# Patient Record
Sex: MALE | Race: WHITE | NOT HISPANIC OR LATINO | URBAN - METROPOLITAN AREA
[De-identification: names, ages, dates, MRNs, and addresses within clinical notes are randomized per-mention and may not be internally consistent; named-entity substitution may affect disease eponyms.]

---

## 2017-02-15 ENCOUNTER — OFFICE VISIT - HEALTHEAST (OUTPATIENT)
Dept: FAMILY MEDICINE | Facility: CLINIC | Age: 57
End: 2017-02-15

## 2017-02-15 ENCOUNTER — RECORDS - HEALTHEAST (OUTPATIENT)
Dept: GENERAL RADIOLOGY | Facility: CLINIC | Age: 57
End: 2017-02-15

## 2017-02-15 DIAGNOSIS — J45.901 ASTHMA EXACERBATION: ICD-10-CM

## 2017-02-15 DIAGNOSIS — J10.1 INFLUENZA A: ICD-10-CM

## 2017-02-15 DIAGNOSIS — J02.9 SORE THROAT: ICD-10-CM

## 2017-02-15 DIAGNOSIS — R05.9 COUGH: ICD-10-CM

## 2017-02-15 ASSESSMENT — MIFFLIN-ST. JEOR: SCORE: 2016.67

## 2017-04-14 ENCOUNTER — OFFICE VISIT - HEALTHEAST (OUTPATIENT)
Dept: FAMILY MEDICINE | Facility: CLINIC | Age: 57
End: 2017-04-14

## 2017-04-14 DIAGNOSIS — M76.60 ACHILLES TENDINITIS: ICD-10-CM

## 2017-04-14 ASSESSMENT — MIFFLIN-ST. JEOR: SCORE: 2024.83

## 2017-06-19 ENCOUNTER — COMMUNICATION - HEALTHEAST (OUTPATIENT)
Dept: FAMILY MEDICINE | Facility: CLINIC | Age: 57
End: 2017-06-19

## 2017-06-19 DIAGNOSIS — J45.901 ASTHMA ATTACK: ICD-10-CM

## 2017-06-20 ENCOUNTER — COMMUNICATION - HEALTHEAST (OUTPATIENT)
Dept: FAMILY MEDICINE | Facility: CLINIC | Age: 57
End: 2017-06-20

## 2017-06-20 DIAGNOSIS — J45.901 ASTHMA ATTACK: ICD-10-CM

## 2017-06-21 ENCOUNTER — COMMUNICATION - HEALTHEAST (OUTPATIENT)
Dept: FAMILY MEDICINE | Facility: CLINIC | Age: 57
End: 2017-06-21

## 2017-06-21 DIAGNOSIS — J45.901 ASTHMA ATTACK: ICD-10-CM

## 2017-12-08 ENCOUNTER — AMBULATORY - HEALTHEAST (OUTPATIENT)
Dept: FAMILY MEDICINE | Facility: CLINIC | Age: 57
End: 2017-12-08

## 2017-12-08 ENCOUNTER — OFFICE VISIT - HEALTHEAST (OUTPATIENT)
Dept: FAMILY MEDICINE | Facility: CLINIC | Age: 57
End: 2017-12-08

## 2017-12-08 DIAGNOSIS — Z00.00 ROUTINE GENERAL MEDICAL EXAMINATION AT A HEALTH CARE FACILITY: ICD-10-CM

## 2017-12-08 DIAGNOSIS — G47.33 OBSTRUCTIVE SLEEP APNEA: ICD-10-CM

## 2017-12-08 DIAGNOSIS — J45.20 MILD INTERMITTENT ASTHMA WITHOUT COMPLICATION: ICD-10-CM

## 2017-12-08 DIAGNOSIS — J30.9 ALLERGIC RHINITIS: ICD-10-CM

## 2017-12-08 LAB
CHOLEST SERPL-MCNC: 209 MG/DL
FASTING STATUS PATIENT QL REPORTED: NO
HDLC SERPL-MCNC: 51 MG/DL
LDLC SERPL CALC-MCNC: 134 MG/DL
PSA SERPL-MCNC: 1.4 NG/ML (ref 0–3.5)
TRIGL SERPL-MCNC: 121 MG/DL

## 2017-12-08 ASSESSMENT — MIFFLIN-ST. JEOR: SCORE: 1917.1

## 2017-12-10 ENCOUNTER — COMMUNICATION - HEALTHEAST (OUTPATIENT)
Dept: FAMILY MEDICINE | Facility: CLINIC | Age: 57
End: 2017-12-10

## 2018-01-26 ENCOUNTER — OFFICE VISIT - HEALTHEAST (OUTPATIENT)
Dept: SLEEP MEDICINE | Facility: CLINIC | Age: 58
End: 2018-01-26

## 2018-01-26 DIAGNOSIS — G47.8 SLEEP DYSFUNCTION WITH SLEEP STAGE DISTURBANCE: ICD-10-CM

## 2018-01-26 DIAGNOSIS — G47.33 OSA (OBSTRUCTIVE SLEEP APNEA): ICD-10-CM

## 2018-01-26 ASSESSMENT — MIFFLIN-ST. JEOR: SCORE: 1917.1

## 2018-01-29 ENCOUNTER — AMBULATORY - HEALTHEAST (OUTPATIENT)
Dept: SLEEP MEDICINE | Facility: CLINIC | Age: 58
End: 2018-01-29

## 2018-01-31 ENCOUNTER — AMBULATORY - HEALTHEAST (OUTPATIENT)
Dept: SLEEP MEDICINE | Facility: CLINIC | Age: 58
End: 2018-01-31

## 2018-01-31 ENCOUNTER — COMMUNICATION - HEALTHEAST (OUTPATIENT)
Dept: SLEEP MEDICINE | Facility: CLINIC | Age: 58
End: 2018-01-31

## 2018-01-31 DIAGNOSIS — G47.10 HYPERSOMNIA: ICD-10-CM

## 2018-01-31 DIAGNOSIS — G47.33 OBSTRUCTIVE SLEEP APNEA: ICD-10-CM

## 2018-05-15 ENCOUNTER — COMMUNICATION - HEALTHEAST (OUTPATIENT)
Dept: FAMILY MEDICINE | Facility: CLINIC | Age: 58
End: 2018-05-15

## 2018-05-25 ENCOUNTER — AMBULATORY - HEALTHEAST (OUTPATIENT)
Dept: FAMILY MEDICINE | Facility: CLINIC | Age: 58
End: 2018-05-25

## 2018-05-25 ENCOUNTER — OFFICE VISIT - HEALTHEAST (OUTPATIENT)
Dept: FAMILY MEDICINE | Facility: CLINIC | Age: 58
End: 2018-05-25

## 2018-05-25 DIAGNOSIS — Z71.84 TRAVEL ADVICE ENCOUNTER: ICD-10-CM

## 2018-05-25 DIAGNOSIS — G47.33 OBSTRUCTIVE SLEEP APNEA: ICD-10-CM

## 2018-05-25 DIAGNOSIS — J45.20 MILD INTERMITTENT ASTHMA WITHOUT COMPLICATION: ICD-10-CM

## 2018-06-22 ENCOUNTER — COMMUNICATION - HEALTHEAST (OUTPATIENT)
Dept: SLEEP MEDICINE | Facility: CLINIC | Age: 58
End: 2018-06-22

## 2019-04-14 ENCOUNTER — COMMUNICATION - HEALTHEAST (OUTPATIENT)
Dept: FAMILY MEDICINE | Facility: CLINIC | Age: 59
End: 2019-04-14

## 2019-04-14 DIAGNOSIS — J45.20 MILD INTERMITTENT ASTHMA WITHOUT COMPLICATION: ICD-10-CM

## 2019-04-14 DIAGNOSIS — J45.901 ASTHMA ATTACK: ICD-10-CM

## 2019-06-14 ENCOUNTER — AMBULATORY - HEALTHEAST (OUTPATIENT)
Dept: FAMILY MEDICINE | Facility: CLINIC | Age: 59
End: 2019-06-14

## 2019-06-14 ENCOUNTER — COMMUNICATION - HEALTHEAST (OUTPATIENT)
Dept: TELEHEALTH | Facility: CLINIC | Age: 59
End: 2019-06-14

## 2019-06-14 ENCOUNTER — OFFICE VISIT - HEALTHEAST (OUTPATIENT)
Dept: FAMILY MEDICINE | Facility: CLINIC | Age: 59
End: 2019-06-14

## 2019-06-14 DIAGNOSIS — G47.33 OBSTRUCTIVE SLEEP APNEA: ICD-10-CM

## 2019-06-14 DIAGNOSIS — J45.20 MILD INTERMITTENT ASTHMA WITHOUT COMPLICATION: ICD-10-CM

## 2019-06-14 DIAGNOSIS — Z00.00 ROUTINE GENERAL MEDICAL EXAMINATION AT A HEALTH CARE FACILITY: ICD-10-CM

## 2019-06-14 LAB
CHOLEST SERPL-MCNC: 185 MG/DL
FASTING STATUS PATIENT QL REPORTED: YES
FASTING STATUS PATIENT QL REPORTED: YES
GLUCOSE BLD-MCNC: 76 MG/DL (ref 70–125)
HDLC SERPL-MCNC: 49 MG/DL
LDLC SERPL CALC-MCNC: 113 MG/DL
PSA SERPL-MCNC: 1.3 NG/ML (ref 0–4.5)
TRIGL SERPL-MCNC: 113 MG/DL

## 2019-06-14 RX ORDER — ALBUTEROL SULFATE 90 UG/1
AEROSOL, METERED RESPIRATORY (INHALATION)
Qty: 36 G | Refills: 5 | Status: SHIPPED | OUTPATIENT
Start: 2019-06-14 | End: 2022-04-06

## 2019-06-14 ASSESSMENT — MIFFLIN-ST. JEOR: SCORE: 1935.24

## 2019-06-16 ENCOUNTER — COMMUNICATION - HEALTHEAST (OUTPATIENT)
Dept: FAMILY MEDICINE | Facility: CLINIC | Age: 59
End: 2019-06-16

## 2019-11-18 ENCOUNTER — OFFICE VISIT - HEALTHEAST (OUTPATIENT)
Dept: FAMILY MEDICINE | Facility: CLINIC | Age: 59
End: 2019-11-18

## 2019-11-18 ENCOUNTER — COMMUNICATION - HEALTHEAST (OUTPATIENT)
Dept: TELEHEALTH | Facility: CLINIC | Age: 59
End: 2019-11-18

## 2019-11-18 ENCOUNTER — RECORDS - HEALTHEAST (OUTPATIENT)
Dept: GENERAL RADIOLOGY | Facility: CLINIC | Age: 59
End: 2019-11-18

## 2019-11-18 DIAGNOSIS — M79.642 PAIN IN BOTH HANDS: ICD-10-CM

## 2019-11-18 DIAGNOSIS — M79.641 PAIN IN RIGHT HAND: ICD-10-CM

## 2019-11-18 DIAGNOSIS — M79.641 PAIN IN BOTH HANDS: ICD-10-CM

## 2019-11-18 DIAGNOSIS — M79.642 PAIN IN LEFT HAND: ICD-10-CM

## 2019-11-20 ENCOUNTER — RECORDS - HEALTHEAST (OUTPATIENT)
Dept: ADMINISTRATIVE | Facility: OTHER | Age: 59
End: 2019-11-20

## 2020-07-28 ENCOUNTER — OFFICE VISIT - HEALTHEAST (OUTPATIENT)
Dept: FAMILY MEDICINE | Facility: CLINIC | Age: 60
End: 2020-07-28

## 2020-07-28 ENCOUNTER — RECORDS - HEALTHEAST (OUTPATIENT)
Dept: GENERAL RADIOLOGY | Facility: CLINIC | Age: 60
End: 2020-07-28

## 2020-07-28 DIAGNOSIS — M25.552 HIP PAIN, LEFT: ICD-10-CM

## 2020-07-28 DIAGNOSIS — M25.562 ACUTE PAIN OF LEFT KNEE: ICD-10-CM

## 2020-07-28 DIAGNOSIS — M25.50 POLYARTHRALGIA: ICD-10-CM

## 2020-07-28 DIAGNOSIS — M25.562 PAIN IN LEFT KNEE: ICD-10-CM

## 2020-07-29 ENCOUNTER — COMMUNICATION - HEALTHEAST (OUTPATIENT)
Dept: FAMILY MEDICINE | Facility: CLINIC | Age: 60
End: 2020-07-29

## 2020-07-29 LAB — B BURGDOR IGG+IGM SER QL: 0.02 INDEX VALUE

## 2020-07-30 ENCOUNTER — COMMUNICATION - HEALTHEAST (OUTPATIENT)
Dept: FAMILY MEDICINE | Facility: CLINIC | Age: 60
End: 2020-07-30

## 2020-08-17 ENCOUNTER — RECORDS - HEALTHEAST (OUTPATIENT)
Dept: ADMINISTRATIVE | Facility: OTHER | Age: 60
End: 2020-08-17

## 2020-09-01 ENCOUNTER — RECORDS - HEALTHEAST (OUTPATIENT)
Dept: ADMINISTRATIVE | Facility: OTHER | Age: 60
End: 2020-09-01

## 2020-09-21 ENCOUNTER — RECORDS - HEALTHEAST (OUTPATIENT)
Dept: ADMINISTRATIVE | Facility: OTHER | Age: 60
End: 2020-09-21

## 2021-01-21 ENCOUNTER — OFFICE VISIT (OUTPATIENT)
Dept: PRIMARY CARE CLINIC | Age: 61
End: 2021-01-21
Payer: COMMERCIAL

## 2021-01-21 VITALS
OXYGEN SATURATION: 96 % | DIASTOLIC BLOOD PRESSURE: 84 MMHG | BODY MASS INDEX: 37.29 KG/M2 | WEIGHT: 260.5 LBS | TEMPERATURE: 96.9 F | HEIGHT: 70 IN | SYSTOLIC BLOOD PRESSURE: 130 MMHG | HEART RATE: 115 BPM

## 2021-01-21 DIAGNOSIS — R19.7 DIARRHEA, UNSPECIFIED TYPE: Primary | ICD-10-CM

## 2021-01-21 DIAGNOSIS — Z20.822 ENCOUNTER FOR LABORATORY TESTING FOR COVID-19 VIRUS: ICD-10-CM

## 2021-01-21 DIAGNOSIS — R11.0 NAUSEA: ICD-10-CM

## 2021-01-21 DIAGNOSIS — R50.9 FEVER, UNSPECIFIED FEVER CAUSE: ICD-10-CM

## 2021-01-21 PROCEDURE — 99203 OFFICE O/P NEW LOW 30 MIN: CPT | Performed by: NURSE PRACTITIONER

## 2021-01-21 RX ORDER — ONDANSETRON 4 MG/1
4 TABLET, ORALLY DISINTEGRATING ORAL EVERY 8 HOURS PRN
Qty: 12 TABLET | Refills: 0 | Status: SHIPPED | OUTPATIENT
Start: 2021-01-21

## 2021-01-21 RX ORDER — FLUTICASONE PROPIONATE 220 UG/1
1 AEROSOL, METERED RESPIRATORY (INHALATION) 2 TIMES DAILY PRN
COMMUNITY
End: 2022-01-10 | Stop reason: SDUPTHER

## 2021-01-21 RX ORDER — FLUTICASONE PROPIONATE 50 MCG
1 SPRAY, SUSPENSION (ML) NASAL DAILY PRN
COMMUNITY

## 2021-01-21 ASSESSMENT — ENCOUNTER SYMPTOMS
CONSTIPATION: 0
ABDOMINAL PAIN: 0
TROUBLE SWALLOWING: 0
COUGH: 0
VOMITING: 1
SORE THROAT: 0
RHINORRHEA: 0
DIARRHEA: 1
NAUSEA: 1
SHORTNESS OF BREATH: 0

## 2021-01-21 NOTE — PROGRESS NOTES
Missael Armenta (:  1960) is a 56 y.o. male,New patient, here for evaluation of the following chief complaint(s):  Fever (sweated through two sets of sheets last night) and Diarrhea      ASSESSMENT/PLAN:  1. Diarrhea, unspecified type  -     ondansetron (ZOFRAN ODT) 4 MG disintegrating tablet; Take 1 tablet by mouth every 8 hours as needed for Nausea or Vomiting, Disp-12 tablet, R-0Normal  -     COVID-19  2. Fever, unspecified fever cause  -     ondansetron (ZOFRAN ODT) 4 MG disintegrating tablet; Take 1 tablet by mouth every 8 hours as needed for Nausea or Vomiting, Disp-12 tablet, R-0Normal  -     COVID-19  3. Nausea  -     ondansetron (ZOFRAN ODT) 4 MG disintegrating tablet; Take 1 tablet by mouth every 8 hours as needed for Nausea or Vomiting, Disp-12 tablet, R-0Normal  4. Encounter for laboratory testing for COVID-19 virus      No follow-ups on file. SUBJECTIVE/OBJECTIVE:  HPI  Here for fever and diarrhea  Onset Monday night. Thursday last [place] [facility] [place] [facility] Fever high 100.1  Took some nyquil. Last night sweat thru sheets into mattress. Review of Systems   Constitutional: Positive for chills, diaphoresis and fever. Negative for activity change, appetite change, fatigue and unexpected weight change. HENT: Negative for ear pain, rhinorrhea, sore throat and trouble swallowing. Eyes: Negative for visual disturbance. Respiratory: Negative for cough and shortness of breath. Cardiovascular: Negative for chest pain, palpitations and leg swelling. Gastrointestinal: Positive for diarrhea, nausea and vomiting. Negative for abdominal pain and constipation. Genitourinary: Negative for flank pain. Musculoskeletal: Negative for arthralgias, myalgias, neck pain and neck stiffness. Neurological: Negative for headaches. Psychiatric/Behavioral: Negative for decreased concentration and sleep disturbance. The patient is not nervous/anxious. Physical Exam  Vitals signs reviewed. Constitutional:       Appearance: Normal appearance. HENT:      Head: Normocephalic and atraumatic. Right Ear: Tympanic membrane, ear canal and external ear normal.      Left Ear: Tympanic membrane, ear canal and external ear normal.      Nose: Nose normal.      Mouth/Throat:      Mouth: Mucous membranes are moist.      Pharynx: Oropharynx is clear. Eyes:      Conjunctiva/sclera: Conjunctivae normal.   Neck:      Musculoskeletal: Normal range of motion and neck supple. Cardiovascular:      Rate and Rhythm: Normal rate and regular rhythm. Pulses: Normal pulses. Heart sounds: Normal heart sounds. Pulmonary:      Effort: Pulmonary effort is normal.      Breath sounds: Normal breath sounds. Abdominal:      General: Bowel sounds are normal. There is no distension. Palpations: Abdomen is soft. Tenderness: There is no abdominal tenderness. There is no guarding. Skin:     General: Skin is warm. Neurological:      Mental Status: He is alert and oriented to person, place, and time. An electronic signature was used to authenticate this note.     --LIAM Uriarte

## 2021-01-23 LAB — SARS-COV-2, NAA: NOT DETECTED

## 2021-02-22 ENCOUNTER — RECORDS - HEALTHEAST (OUTPATIENT)
Dept: ADMINISTRATIVE | Facility: OTHER | Age: 61
End: 2021-02-22

## 2021-05-04 ENCOUNTER — OFFICE VISIT - HEALTHEAST (OUTPATIENT)
Dept: FAMILY MEDICINE | Facility: CLINIC | Age: 61
End: 2021-05-04

## 2021-05-04 DIAGNOSIS — G47.33 OBSTRUCTIVE SLEEP APNEA: ICD-10-CM

## 2021-05-04 DIAGNOSIS — M17.12 PRIMARY OSTEOARTHRITIS OF LEFT KNEE: ICD-10-CM

## 2021-05-04 DIAGNOSIS — J45.20 MILD INTERMITTENT ASTHMA WITHOUT COMPLICATION: ICD-10-CM

## 2021-05-04 DIAGNOSIS — Z01.818 PREOP GENERAL PHYSICAL EXAM: ICD-10-CM

## 2021-05-04 LAB
ANION GAP SERPL CALCULATED.3IONS-SCNC: 12 MMOL/L (ref 5–18)
BUN SERPL-MCNC: 16 MG/DL (ref 8–22)
CALCIUM SERPL-MCNC: 9.3 MG/DL (ref 8.5–10.5)
CHLORIDE BLD-SCNC: 105 MMOL/L (ref 98–107)
CO2 SERPL-SCNC: 23 MMOL/L (ref 22–31)
CREAT SERPL-MCNC: 0.97 MG/DL (ref 0.7–1.3)
ERYTHROCYTE [DISTWIDTH] IN BLOOD BY AUTOMATED COUNT: 12.4 % (ref 11–14.5)
GFR SERPL CREATININE-BSD FRML MDRD: >60 ML/MIN/1.73M2
GLUCOSE BLD-MCNC: 108 MG/DL (ref 70–125)
HCT VFR BLD AUTO: 41.4 % (ref 40–54)
HGB BLD-MCNC: 14.3 G/DL (ref 14–18)
MCH RBC QN AUTO: 28.7 PG (ref 27–34)
MCHC RBC AUTO-ENTMCNC: 34.5 G/DL (ref 32–36)
MCV RBC AUTO: 83 FL (ref 80–100)
PLATELET # BLD AUTO: 230 THOU/UL (ref 140–440)
PMV BLD AUTO: 9.2 FL (ref 7–10)
POTASSIUM BLD-SCNC: 4 MMOL/L (ref 3.5–5)
RBC # BLD AUTO: 4.99 MILL/UL (ref 4.4–6.2)
SODIUM SERPL-SCNC: 140 MMOL/L (ref 136–145)
WBC: 5.4 THOU/UL (ref 4–11)

## 2021-05-04 ASSESSMENT — MIFFLIN-ST. JEOR: SCORE: 1976.07

## 2021-05-05 LAB
SARS-COV-2 PCR COMMENT: NORMAL
SARS-COV-2 RNA SPEC QL NAA+PROBE: NEGATIVE
SARS-COV-2 VIRUS SPECIMEN SOURCE: NORMAL

## 2021-05-06 ENCOUNTER — COMMUNICATION - HEALTHEAST (OUTPATIENT)
Dept: SCHEDULING | Facility: CLINIC | Age: 61
End: 2021-05-06

## 2021-05-10 ENCOUNTER — RECORDS - HEALTHEAST (OUTPATIENT)
Dept: ADMINISTRATIVE | Facility: OTHER | Age: 61
End: 2021-05-10

## 2021-05-25 ENCOUNTER — RECORDS - HEALTHEAST (OUTPATIENT)
Dept: ADMINISTRATIVE | Facility: OTHER | Age: 61
End: 2021-05-25

## 2021-05-27 VITALS
TEMPERATURE: 98.6 F | DIASTOLIC BLOOD PRESSURE: 76 MMHG | OXYGEN SATURATION: 96 % | BODY MASS INDEX: 37.08 KG/M2 | SYSTOLIC BLOOD PRESSURE: 126 MMHG | WEIGHT: 259 LBS | HEART RATE: 77 BPM | RESPIRATION RATE: 18 BRPM | HEIGHT: 70 IN

## 2021-05-27 NOTE — TELEPHONE ENCOUNTER
Patient is out of medication.  Uses this primary in the spring due to allergies.          Refill Request  Did you contact pharmacy: Yes  Medication name:   Requested Prescriptions     Pending Prescriptions Disp Refills     fluticasone propionate (FLOVENT HFA) 220 mcg/actuation inhaler [Pharmacy Med Name: FLOVENT  MC[facility] ORAL INH 120INH]  0     Sig: INHALE 1 PUFF BY MOUTH TWICE DAILY. RINSE MOUTH AFTER USE.     Who prescribed the medication: Sean Dhaliwal MD   Pharmacy Name and Location: [place] [address]  Is patient out of medication: Yes  Patient notified refills processed in 72 hours:  yes  Okay to leave a detailed message: yes

## 2021-05-27 NOTE — TELEPHONE ENCOUNTER
RN cannot approve Refill Request    RN can NOT refill this medication med is not covered by policy/route to provider       . Last office visit: 5/25/2018 Sean Dhaliwal MD Last Physical: 12/8/2017 Last MTM visit: Visit date not found Last visit same specialty: 5/25/2018 Sean Dhaliwal MD.  Next visit within 3 mo: Visit date not found  Next physical within 3 mo: Visit date not found      Beth Iqbal, [facility] Triage/Med Refill 4/15/2019    Requested Prescriptions   Pending Prescriptions Disp Refills     fluticasone propionate (FLOVENT HFA) 220 mcg/actuation inhaler [Pharmacy Med Name: FLOVENT  [facility] ORAL INH 120INH]  0     Sig: INHALE 1 PUFF BY MOUTH TWICE DAILY. RINSE MOUTH AFTER USE.       There is no refill protocol information for this order

## 2021-05-28 ASSESSMENT — ASTHMA QUESTIONNAIRES: ACT_TOTALSCORE: 20

## 2021-05-29 NOTE — PROGRESS NOTES
" Patient ID: Jose Harvey is a 59 y.o. male.  /76   Pulse (!) 59   Ht 5' 10\" (1.778 m)   Wt (!) 250 lb (113.4 kg)   SpO2 96%   BMI 35.87 kg/m      Assessment/Plan:                   Diagnoses and all orders for this visit:    Routine general medical examination at a health care facility  -     Lipid Cascade  -     Glucose  -     PSA, Annual Screen (Prostatic-Specific Antigen)    Mild intermittent asthma without complication  -     fluticasone propionate (FLOVENT HFA) 220 mcg/actuation inhaler; INHALE 1 PUFF BY MOUTH TWICE DAILY. RINSE MOUTH AFTER USE.  Dispense: 1 Inhaler; Refill: 6  -     albuterol (VENTOLIN HFA) 90 mcg/actuation inhaler; INHALE TWO PUFFS BY MOUTH EVERY 4 HOURS AS NEEDED  Dispense: 36 g; Refill: 5    Obstructive sleep apnea      DISCUSSION  Obtain labs as above.  Continue current management for asthma.  Subjective:     HPI    Jose Harvey is a 59-year-old man medical history includes mild persistent asthma under good control with typical triggers allergens.  He also has obstructive sleep apnea uses CPAP.  Reports no concerns regarding sleep apnea.  Takes no other prescription medications other than those to control asthma.  Is immunized appropriately.  Discussed routine laboratory work.  Reviewed routine health considerations.  Discussed briefly some joint pains.  Otherwise overall doing well.      Review of Systems  Complete review of systems is obtained.  Other than the specific considerations noted above complete review of systems is negative.          Objective:   Medications:  Current Outpatient Medications   Medication Sig     albuterol (VENTOLIN HFA) 90 mcg/actuation inhaler INHALE TWO PUFFS BY MOUTH EVERY 4 HOURS AS NEEDED     fluticasone (FLONASE) 50 mcg/actuation nasal spray 1 spray into each nostril daily.     fluticasone propionate (FLOVENT HFA) 220 mcg/actuation inhaler INHALE 1 PUFF BY MOUTH TWICE DAILY. RINSE MOUTH AFTER USE.     sildenafil, antihypertensive, " (REVATIO) 20 mg tablet Take 1-3 tablets (20-60 mg total) by mouth daily as needed.       Allergies:  Allergies   Allergen Reactions     Metronidazole        Tobacco:   reports that he has never smoked. He has never used smokeless tobacco.    HEALTH PREVENTION    General  Dental care: Discussed the importance of regular dental care.  Eye care: Discussed importance of routine eye exams for glaucoma screening  Exercise: Discussed increasing frequency and duration of exercise  Diet: Ellen ways to improve diet.    Wt Readings from Last 3 Encounters:   06/14/19 (!) 250 lb (113.4 kg)   05/25/18 (!) 248 lb (112.5 kg)   01/26/18 (!) 246 lb (111.6 kg)     Body mass index is 35.87 kg/m .    The following high BMI interventions were performed this visit: encouragement to exercise    Cancer screening  Testicular cancer:is discussed and exam performed today  Skin cancer: Discussed sun burn prevention and self monitoring.  Colon cancer: Colon cancer screening is discussed.  Due for colonoscopy based on 3-year follow-up in 2016.  We need to obtain updated colonoscopy records.  Prostate cancer: Discussed continued utilization of PSA and digital rectal exam for screening    Cholesterol:   LDL Calculated (mg/dL)   Date Value   12/08/2017 134 (H)   06/27/2016 123   10/22/2013 131 (H)      Blood Pressure:   BP Readings from Last 3 Encounters:   06/14/19 126/76   05/25/18 126/76   01/26/18 138/80     Immunization History   Administered Date(s) Administered     DT (pediatric) 03/01/2004     Hep A, Adult IM (19yr & older) 02/07/2008     Hep A, historic 02/07/2008     Influenza, Seasonal, Inj PF IIV3 10/08/2015     Influenza, inj, historic,unspecified 10/01/2015     Influenza, seasonal,quad inj 36+ mos 12/08/2017     Influenza, seasonal,quad inj 6-35 mos 10/22/2013     Influenza,seasonal, Inj IIV3 10/22/2013     Pneumo Conj 13-V (2010&after) 12/08/2017     Pneumo Polysac 23-V 10/22/2013     Td, Adult, Absorbed 03/01/2004      "Td,adult,historic,unspecified 03/01/2004     Tdap 10/22/2013     There are no preventive care reminders to display for this patient.     Physical Exam      /76   Pulse (!) 59   Ht 5' 10\" (1.778 m)   Wt (!) 250 lb (113.4 kg)   SpO2 96%   BMI 35.87 kg/m          General Appearance:    Alert, cooperative, no distress   Eyes:   No scleral icterus or conjunctival irritation       Ears:    Normal TM's and external ear canals, both ears   Throat:   Lips, mucosa, and tongue normal; teeth and gums normal   Neck:   Supple, symmetrical, trachea midline, no adenopathy;        thyroid:  No enlargement/tenderness/nodules   Lungs:     Clear to auscultation bilaterally, respirations unlabored, no wheezes or crackles   Heart:    Regular rate and rhythm,  No murmur   Abdomen:    Soft, no distention, no tenderness on palpation, no masses, no organomegaly     Extremities:  No edema, no joint swelling or redness, no evidence of any injuries   Skin:  No concerning skin findings, no suspicious moles, no rashes   Neurologic:  On gross examination there is no motor or sensory deficit.  Patient walks with a normal gait     Genitalia:   Normal testicular anatomy, no inguinal hernias, no skin findings in the genital region   Rectal:    Normal tone, no hemorrhoids masses or other anal rectal findings, prostate has a smooth uniform consistency without nodules                                 "

## 2021-05-30 VITALS — BODY MASS INDEX: 37.52 KG/M2 | HEIGHT: 71 IN | WEIGHT: 268 LBS

## 2021-05-30 VITALS — WEIGHT: 266.2 LBS | BODY MASS INDEX: 37.27 KG/M2 | HEIGHT: 71 IN

## 2021-05-31 VITALS — HEIGHT: 70 IN | BODY MASS INDEX: 35.22 KG/M2 | WEIGHT: 246 LBS

## 2021-06-01 VITALS — HEIGHT: 70 IN | WEIGHT: 246 LBS | BODY MASS INDEX: 35.22 KG/M2

## 2021-06-01 VITALS — BODY MASS INDEX: 35.58 KG/M2 | WEIGHT: 248 LBS

## 2021-06-02 ENCOUNTER — RECORDS - HEALTHEAST (OUTPATIENT)
Dept: ADMINISTRATIVE | Facility: CLINIC | Age: 61
End: 2021-06-02

## 2021-06-03 VITALS — HEIGHT: 70 IN | WEIGHT: 250 LBS | BODY MASS INDEX: 35.79 KG/M2

## 2021-06-03 NOTE — PROGRESS NOTES
Patient ID: Jose Harvey is a 59 y.o. male.  /76   Wt (!) 235 lb (106.6 kg)   BMI 33.72 kg/m      Assessment/Plan:                   Diagnoses and all orders for this visit:    Pain in both hands  -     XR Hands Bilateral 2 VWS; Future; Expected date: 11/18/2019    Other orders  -     Cancel: Influenza, Recombinant, Inj, Quadrivalent, PF, 18+YRS           DISCUSSION  For now continue conservative means for treatment, will refer to orthopedic hand specialist for more definitive evaluation and treatment.  Subjective:     HPI    Jose Harvey is a 59 y.o. male with a history of asthma and obstructive sleep apnea here today concerned regarding bilateral wrist hand and thumb pain.    Patient reports that he has pain primarily in the left hand but present in both.  It is present near the base of the thumb at the wrist.  It is quite tender and bothersome.  He has tried conservative means for treatment including relative rest, icing and over-the-counter medications none of which have been helpful in alleviating the symptoms.  It seems to be more progressive.  He does not note any swelling or redness.  He has no history of specific trauma.  He does report he was doing some boxing for fitness within the past year but has since stopped.  He denies any numbness or tingling.      Review of Systems  Complete review of systems is obtained.  Other than the specific considerations noted above complete review of systems is negative.          Objective:   Medications:  Current Outpatient Medications   Medication Sig     albuterol (VENTOLIN HFA) 90 mcg/actuation inhaler INHALE TWO PUFFS BY MOUTH EVERY 4 HOURS AS NEEDED     fluticasone (FLONASE) 50 mcg/actuation nasal spray 1 spray into each nostril daily.     fluticasone propionate (FLOVENT HFA) 220 mcg/actuation inhaler INHALE 1 PUFF BY MOUTH TWICE DAILY. RINSE MOUTH AFTER USE.     sildenafil, antihypertensive, (REVATIO) 20 mg tablet Take 1-3 tablets (20-60 mg total)  by mouth daily as needed.       Allergies:  Allergies   Allergen Reactions     Metronidazole        Tobacco:   reports that he has never smoked. He has never used smokeless tobacco.     Physical Exam          /76   Wt (!) 235 lb (106.6 kg)   BMI 33.72 kg/m        General: Patient alert no signs of distress    Examination of his hands reveals that there is no obvious joint deformities or bony deformities.  There is no redness bruising or swelling.  He has good range of motion.  Reports pain at the base of the thumb near the metacarpal carpal joint region.  There is no distinct anatomical snuffbox tenderness.  He does report some wrist and redness and stiffness but does not seem to have any limitation of range of motion on either side.  There is no evidence of muscle atrophy.  Sensation is intact distally.    Plain film shows degenerative changes, there is significant changes noted in the left metacarpal carpal joint region.

## 2021-06-04 VITALS
SYSTOLIC BLOOD PRESSURE: 140 MMHG | DIASTOLIC BLOOD PRESSURE: 90 MMHG | WEIGHT: 258.7 LBS | HEART RATE: 70 BPM | BODY MASS INDEX: 37.12 KG/M2 | OXYGEN SATURATION: 96 %

## 2021-06-04 VITALS — SYSTOLIC BLOOD PRESSURE: 128 MMHG | DIASTOLIC BLOOD PRESSURE: 76 MMHG | WEIGHT: 235 LBS | BODY MASS INDEX: 33.72 KG/M2

## 2021-06-08 NOTE — PROGRESS NOTES
"Jose is a 56 y.o. male presenting to the clinic for concerns for cold symptoms for 7 days.  Patient complains of sinus congestion, postnasal drainage, sore throat, productive cough, fatigue, body aches, lack of appetite, shortness of breath, chest tightness, and wheezing.  He denies fever, stomachache, nausea, vomiting.  He has been taking over-the-counter NyQuil. No one else around his is ill.   He did not receive an influenza vaccine.  He does have asthma that has not needed his Ventolin inhaler.  He typically does not use Flovent until spring.    Review of Systems: A complete 14 point review of systems was obtained and is negative or as stated in the history of present illness.    Social History     Social History     Marital status:      Spouse name: N/A     Number of children: N/A     Years of education: N/A     Occupational History     Not on file.     Social History Main Topics     Smoking status: Never Smoker     Smokeless tobacco: Not on file     Alcohol use Not on file     Drug use: Not on file     Sexual activity: Not on file     Other Topics Concern     Not on file     Social History Narrative       Active Ambulatory Problems     Diagnosis Date Noted     Chronic Allergic Conjunctivitis      Allergic Rhinitis      Mild Persistent Asthma      Male Erectile Disorder      Esophageal Reflux      Skin Neoplasm Of Uncertain Behavior      Obstructive Sleep Apnea      Fatigue      Benign Adenomatous Polyp Of The Large Intestine      Fever 02/05/2015     Nasal congestion 02/05/2015     Cough 02/05/2015     Resolved Ambulatory Problems     Diagnosis Date Noted     No Resolved Ambulatory Problems     No Additional Past Medical History       No family history on file.    OBJECTIVE:     Visit Vitals     /70 (Patient Site: Left Arm, Patient Position: Sitting, Cuff Size: Adult Large)     Pulse 75     Temp 98  F (36.7  C)     Ht 5' 10.5\" (1.791 m)     Wt (!) 266 lb 3.2 oz (120.7 kg)     SpO2 97%     BMI " 37.66 kg/m2       Patient is alert, in no obvious distress.   Skin: Warm, dry.  No lesions or rashes.  Skin turgor rapid return.   HEENT:  Head normocephalic, atraumatic.  Eyes normal.  Ears normal.  Nose patent, mucosa red.  Oropharynx erythematous.  No lesions or tonsillar enlargement.   Neck: Supple, no lymphadenopathy.  Lungs:  Expiratory wheezing heard in bilateral lung bases.  Respirations even and unlabored.  No rhonchi or rales noted.   Heart:  Regular rate and rhythm.  No murmurs.     LABORATORY: Rapid strep, strep culture, influenza A and B ordered.  Rapid strep is negative.  Influenza A is positive.    I ordered and personally reviewed a chest x-ray showing no obvious infiltrate.  Will have radiology review.        ASSESSMENT AND PLAN:     1. Influenza A     2. Asthma exacerbation     3. Cough  Influenza A/B Rapid Test    XR Chest PA and Lateral   4. Sore throat  Rapid Strep A Screen-Throat    Group A Strep, RNA Direct Detection, Throat     Discussed viral nature of influenza.  I discussed my concern regarding his wheezing.  He refused an oral steroid stating that he plans on using his inhalers.  He was discouraged that he was not receiving an antibiotic today.  I told him that I would notify him of the radiology results.  Discussed concerning symptoms of fever and worsening cough.  If this occurs, I urged him to follow-up with Dr. Dhaliwal.

## 2021-06-10 NOTE — PROGRESS NOTES
" Patient ID: Jose Harvey is a 57 y.o. male.  /76  Pulse 66  Ht 5' 10.5\" (1.791 m)  Wt (!) 268 lb (121.6 kg)  SpO2 97%  BMI 37.91 kg/m2    Assessment/Plan:                   Diagnoses and all orders for this visit:    Achilles tendinitis      DISCUSSION  There is are consistent with Achilles tendinitis.  Discussed conservative care including good supportive footwear.  Discussed basic rehabilitation exercises as well as the principles of rest ice and use of NSAIDs.  Subjective:     HPI    Jose Harvey is a 57-year-old man who is here today concerned regarding pain in the Achilles tendon on the left.  Symptoms have been present for about 2 months.  No known inciting factor.  No history of injury.  Pain symptoms have waxed and waned.  Has tried new footwear along with inserts which have helped somewhat.  After returning from vacation reports that pain symptoms worsened considerably.  Doing better after exercising for a couple of days.  Denies numbness or tingling.  No swelling or redness.    Review of Systems  Complete review of systems is obtained.  Other than the specific considerations noted above complete review of systems is negative.          Objective:   Medications:  Current Outpatient Prescriptions   Medication Sig     FLOVENT  mcg/actuation inhaler INHALE ONE PUFF TWICE DAILY, RINSE MOUTH AFTER USE     fluticasone (FLONASE) 50 mcg/actuation nasal spray INSTILL 2 SPRAYS IN EACH NOSTRIL ONCE DAILY     sildenafil (REVATIO) 20 mg tablet Take 1-3 tablets (20-60 mg total) by mouth daily as needed.     VENTOLIN HFA 90 mcg/actuation inhaler INHALE TWO PUFFS BY MOUTH EVERY 4 HOURS AS NEEDED     polymyxin B sulf-trimethoprim (POLYTRIM) 10,000 unit- 1 mg/mL Drop ophthalmic drops Administer 2 drops to both eyes 4 (four) times a day.       Allergies:  Allergies   Allergen Reactions     Metronidazole        Tobacco:   reports that he has never smoked. He does not have any smokeless tobacco history " "on file.     Physical Exam          /76  Pulse 66  Ht 5' 10.5\" (1.791 m)  Wt (!) 268 lb (121.6 kg)  SpO2 97%  BMI 37.91 kg/m2      General: Patient is alert no signs of distress    Foot: Examination of the left foot does not reveal any bruising redness or swelling.  He is a relatively high arch in the foot.  No pain to palpation along the bottom surface of the foot.  In the more posterior heel there is tenderness along with tenderness in the distal aspect of the Achilles near its insertion site on the calcaneus.  There is no bruising redness or swelling.  Palpation reveals that the Achilles is obviously intact.  He has full dorsiflexion and plantarflexion although complains of some pain.            "

## 2021-06-10 NOTE — TELEPHONE ENCOUNTER
----- Message from Afua Araujo CMA sent at 7/29/2020  8:33 AM CDT -----    ----- Message -----  From: Clara Luther CNP  Sent: 7/28/2020   5:08 PM CDT  To: Clara Luther Care Team Pool    Please notify the patient that his x-ray showed degenerative changes (osteoarthritis) of the knee.  He also has calcification of the cartilage in his knee.  I recommend he see orthopedics as we discussed.  Thanks.

## 2021-06-10 NOTE — PROGRESS NOTES
Assessment and Plan:     1. Acute pain of left knee  XR Knee Left 1 or 2 VWS    Ambulatory referral to Orthopedics   2. Hip pain, left     3. Polyarthralgia  Lyme Antibody [place]     Differentials for left knee pain include osteoarthritis, bursitis, meniscus or ligament injury.  X-ray shows osteoarthritis.  He has a history of a right knee replacement.  Will refer to orthopedics for further evaluation and treatment.  Hip pain is likely due to compensation for the knee pain.  Patient requests Lyme's testing.  Will notify patient of results.  He is content with the plan.    Subjective:     Jose is a 60 y.o. male presenting to the clinic for concerns for left knee pain for 3 months.  Patient denies any known injury, but states that he he was bucked off a horse in the wintertime.  He landed on his right side instead of his left.  He has difficulty walking up and down stairs.  He states the knee is unstable and is weak.  He is now experiencing pain within his left hip and lower lumbar region.  He admits he has gained 30 pounds since the onset of the COVID-19 pandemic.  He has not noticed any redness, swelling, bruising of the knee.  He does have some pain behind the knee.  He has been taking Tylenol arthritis.  He has a history of a right knee replacement in 2011.  Patient also requests Lyme's testing.  [place].  He has been told by numerous friends that they have tested positive for Lyme's.  He has been experiencing some mild fatigue.  He denies any known tick bites.    Review of Systems: A complete 14 point review of systems was obtained and is negative or as stated in the history of present illness.    Social History     Socioeconomic History     Marital status:      Spouse name: Not on file     Number of children: Not on file     Years of education: Not on file     Highest education level: Not on file   Occupational History     Not on file   Social Needs      Financial resource strain: Not on file     Food insecurity     Worry: Not on file     Inability: Not on file     Transportation needs     Medical: Not on file     Non-medical: Not on file   Tobacco Use     Smoking status: Never Smoker     Smokeless tobacco: Never Used   Substance and Sexual Activity     Alcohol use: Not on file     Drug use: Not on file     Sexual activity: Not on file   Lifestyle     Physical activity     Days per week: Not on file     Minutes per session: Not on file     Stress: Not on file   Relationships     Social connections     Talks on phone: Not on file     Gets together: Not on file     Attends Restoration service: Not on file     Active member of club or organization: Not on file     Attends meetings of clubs or organizations: Not on file     Relationship status: Not on file     Intimate partner violence     Fear of current or ex partner: Not on file     Emotionally abused: Not on file     Physically abused: Not on file     Forced sexual activity: Not on file   Other Topics Concern     Not on file   Social History Narrative     Not on file       Active Ambulatory Problems     Diagnosis Date Noted     Chronic Allergic Conjunctivitis      Allergic Rhinitis      Asthma      Male Erectile Disorder      Esophageal Reflux      Skin Neoplasm Of Uncertain Behavior      Obstructive sleep apnea      Fatigue      Benign Adenomatous Polyp Of The Large Intestine      Fever 02/05/2015     Nasal congestion 02/05/2015     Cough 02/05/2015     Resolved Ambulatory Problems     Diagnosis Date Noted     No Resolved Ambulatory Problems     No Additional Past Medical History       No family history on file.    Objective:     /90   Pulse 70   Wt (!) 258 lb 11.2 oz (117.3 kg)   SpO2 96%   BMI 37.12 kg/m      Patient is alert, in no obvious distress.   Skin: Warm, dry.    Lungs:  Clear to auscultation. Respirations even and unlabored.  No wheezing or rales noted.   Heart:  Regular rate and rhythm.  No  murmurs, S3, S4, gallops, or rubs.    Musculoskeletal: He has full range of motion of his left knee and hip.  There are no areas of erythema, ecchymosis, signs of trauma.  Valgus, varus, Lachman's, [place]'s are negative.    LABORATORY: I ordered and personally reviewed left knee x-rays osteoarthritis.  Will have radiology review.

## 2021-06-14 NOTE — PROGRESS NOTES
" Patient ID: Jose Harvey is a 57 y.o. male.  /80  Pulse 95  Ht 5' 10\" (1.778 m)  Wt (!) 246 lb (111.6 kg)  SpO2 97%  BMI 35.3 kg/m2    Assessment/Plan:                   Diagnoses and all orders for this visit:    Routine general medical examination at a health care facility  -     Influenza, Seasonal,Quad Inj, 36+ MOS  -     Lipid [place] FASTING  -     PSA, Annual Screen (Prostatic-Specific Antigen)  -     Glucose; Future  -     Glucose    Mild intermittent asthma without complication  -     fluticasone (FLOVENT HFA) 220 mcg/actuation inhaler; INHALE 1 PUFF BY MOUTH TWICE DAILY. RINSE MOUTH AFTER USE  Dispense: 1 Inhaler; Refill: 5  -     albuterol (VENTOLIN HFA) 90 mcg/actuation inhaler; INHALE TWO PUFFS BY MOUTH EVERY 4 HOURS AS NEEDED  Dispense: 36 g; Refill: 5  -     Pneumococcal conjugate vaccine 13-valent 6wks-17yrs; >50yrs    Allergic rhinitis    Obstructive sleep apnea  -     Ambulatory referral to Sleep Medicine    Other orders  -     Discontinue: sildenafil, antihypertensive, (REVATIO) 20 mg tablet; Take 1-3 tablets (20-60 mg total) by mouth daily as needed.  Dispense: 20 tablet; Refill: 6  -     sildenafil, antihypertensive, (REVATIO) 20 mg tablet; Take 1-3 tablets (20-60 mg total) by mouth daily as needed.  Dispense: 20 tablet; Refill: 6      DISCUSSION  Obtain labs as above medications refilled.  Referral to sleep specialist for reevaluation of his sleep apnea given the difficulties he has had tolerating the CPAP recently.  Update immunizations as noted.  Subjective:     HPI    Jose Harvey is a 57-year-old man who is here today for a physical.  His medical history includes intermittent asthma.  Asthma is triggered by allergens.  Utilizes controller medication along with as needed rescue inhaler in the fall and spring of the year.  Not using medication right now and reports no symptoms whatsoever recently.  He has sleep apnea has used CPAP.  Reports that he feels the CPAP is making " him get sinus congestion issues on a regular basis.  Discussed referral to sleep specialist.  His history of erectile dysfunction has used sildenafil with success at 60 mg as needed.  Discussed other routine health preventive measures.      Review of Systems  Complete review of systems is obtained.  Other than the specific considerations noted above complete review of systems is negative.          Objective:   Medications:  Current Outpatient Prescriptions   Medication Sig     albuterol (VENTOLIN HFA) 90 mcg/actuation inhaler INHALE TWO PUFFS BY MOUTH EVERY 4 HOURS AS NEEDED     fluticasone (FLONASE) 50 mcg/actuation nasal spray 1 spray into each nostril daily.     fluticasone (FLOVENT HFA) 220 mcg/actuation inhaler INHALE ONE PUFF TWICE DAILY, RINSE MOUTH AFTER USE     fluticasone (FLOVENT HFA) 220 mcg/actuation inhaler INHALE 1 PUFF BY MOUTH TWICE DAILY. RINSE MOUTH AFTER USE     sildenafil, antihypertensive, (REVATIO) 20 mg tablet Take 1-3 tablets (20-60 mg total) by mouth daily as needed.       Allergies:  Allergies   Allergen Reactions     Metronidazole        Tobacco:   reports that he has never smoked. He does not have any smokeless tobacco history on file.    HEALTH PREVENTION    General  Dental care: Discussed the importance of regular dental care.  Eye care: Discussed importance of routine eye exams for glaucoma screening  Exercise: Reviewed his exercise regimen discussed the importance of continuing to maintain a regular exercise regimen.  Diet: Reviewed principles of a healthy balanced diet.      Wt Readings from Last 3 Encounters:   12/08/17 (!) 246 lb (111.6 kg)   04/14/17 (!) 268 lb (121.6 kg)   02/15/17 (!) 266 lb 3.2 oz (120.7 kg)     Body mass index is 35.3 kg/(m^2).    The following high BMI interventions were performed this visit: encouragement to exercise and weight monitoring    Cancer screening  Testicular cancer:is discussed and exam performed today  Skin cancer: Discussed sun burn prevention  "and self monitoring.  Colon cancer: Colon cancer screening is discussed.  Reviewed most recent colonoscopy report.  Prostate cancer: Escobar considerations related to prostate cancer screening utilizing PSA and digital rectal exam.    Cholesterol:   LDL Calculated (mg/dL)   Date Value   06/27/2016 123   10/22/2013 131 (H)      Blood Pressure:   BP Readings from Last 3 Encounters:   12/08/17 138/80   04/14/17 126/76   02/15/17 120/70     Immunization History   Administered Date(s) Administered     DT (pediatric) 03/01/2004     Hep A, Adult IM (19yr & older) 02/07/2008     Hep A, historic 02/07/2008     Influenza, Seasonal, Inj PF IIV3 10/08/2015     Influenza, inj, historic,unspecified 10/01/2015     Influenza, seasonal,quad inj 36+ mos 12/08/2017     Influenza, seasonal,quad inj 6-35 mos 10/22/2013     Influenza,seasonal, Inj IIV3 10/22/2013     Pneumo Conj 13-V (2010&after) 12/08/2017     Pneumo Polysac 23-V 10/22/2013     Td, Adult, Absorbed 03/01/2004     Td,adult,historic,unspecified 03/01/2004     Tdap 10/22/2013     There are no preventive care reminders to display for this patient.     Physical Exam          /80  Pulse 95  Ht 5' 10\" (1.778 m)  Wt (!) 246 lb (111.6 kg)  SpO2 97%  BMI 35.3 kg/m2    General Appearance:    Alert, cooperative, no distress, appears stated age   Head:    Normocephalic, without obvious abnormality, atraumatic   Eyes:    PERRL, conjunctiva/corneas clear, EOM's intact       Ears:    Normal TM's and external ear canals, both ears   Nose:   Nares normal, septum midline, mucosa normal, no drainage    or sinus tenderness   Throat:   Lips, mucosa, and tongue normal; teeth and gums normal   Neck:   Supple, symmetrical, trachea midline, no adenopathy;        thyroid:  No enlargement/tenderness/nodules   Lungs:     Clear to auscultation bilaterally, respirations unlabored   Chest wall/ Breast:    No tenderness or deformity   Heart:    Regular rate and rhythm, S1 and S2 normal, no " murmur, rub   or gallop   Abdomen:     Soft, non-tender, bowel sounds active all four quadrants,     no masses, no organomegaly   Genitalia:   Normal testicular anatomy no inguinal hernias   Rectal:   Uniform consistency of the prostate no nodules   Extremities:   Extremities normal, atraumatic, no cyanosis or edema   Pulses:   2+ and symmetric all extremities   Skin:   Skin color, texture, turgor normal, no rashes or lesions   Neurologic:   CNII-XII intact. Normal strength, sensation and reflexes       throughout

## 2021-06-15 NOTE — PROGRESS NOTES
Dear Dr. Sean Dhaliwal Md  [address]  [address]  [place] [address]    Thank you for the opportunity to participate in the care of Mr. Jose Harvey.    He is a 57 y.o. male who comes to the clinic for the transfer of care of his obstructive sleep apnea.  The patient was actually diagnosed here in this clinic in 2014 with severe obstructive sleep apnea.  His apnea hypopnea index was grossly elevated at 51.6 events per hour with the lowest O2 sat of 68%.  For further details concerning the patient's sleep study report please refer to the [facility].  The patient states that he had been using CPAP until 6 months ago when he was getting recurrent sinus infections.  His first 2-3 years of CPAP usage was good.  He would be interested in establishing care to get durable medical equipment company from us.  We discussed some strategies and how to mitigate the risk of sinus infections such as timely replacement of filters and scheduled cleaning of equipment.  The patient's review of system is otherwise unremarkable.     Past Medical History  No past medical history on file.     Past Surgical History  No past surgical history on file.     Meds  Current Outpatient Prescriptions   Medication Sig Dispense Refill     albuterol (VENTOLIN HFA) 90 mcg/actuation inhaler INHALE TWO PUFFS BY MOUTH EVERY 4 HOURS AS NEEDED 36 g 5     fluticasone (FLONASE) 50 mcg/actuation nasal spray 1 spray into each nostril daily.       fluticasone (FLOVENT HFA) 220 mcg/actuation inhaler INHALE ONE PUFF TWICE DAILY, RINSE MOUTH AFTER USE 1 Inhaler 5     fluticasone (FLOVENT HFA) 220 mcg/actuation inhaler INHALE 1 PUFF BY MOUTH TWICE DAILY. RINSE MOUTH AFTER USE 1 Inhaler 5     sildenafil, antihypertensive, (REVATIO) 20 mg tablet Take 1-3 tablets (20-60 mg total) by mouth daily as needed. 20 tablet 6     No current facility-administered medications for this visit.         Allergies  Metronidazole     Social History  Social History     Social  History     Marital status:      Spouse name: N/A     Number of children: N/A     Years of education: N/A     Occupational History     Not on file.     Social History Main Topics     Smoking status: Never Smoker     Smokeless tobacco: Never Used     Alcohol use Not on file     Drug use: Not on file     Sexual activity: Not on file     Other Topics Concern     Not on file     Social History Narrative        Family History  No family history on file.     Review of Systems:  Constitutional: Negative except as noted in HPI.   Eyes: Negative except as noted in HPI.   ENT: Negative except as noted in HPI.   Cardiovascular: Negative except as noted in HPI.   Respiratory: Negative except as noted in HPI.   Gastrointestinal: Negative except as noted in HPI.   Genitourinary: Negative except as noted in HPI.   Musculoskeletal: Negative except as noted in HPI.   Integumentary: Negative except as noted in HPI.   Neurological: Negative except as noted in HPI.   Psychiatric: Negative except as noted in HPI.   Endocrine: Negative except as noted in HPI.   Hematologic/Lymphatic: Negative except as noted in HPI.      STOP BANG 1/26/2018   Do you snore loudly (louder than talking or loud enough to be heard through closed doors)? 1   Do you often feel tired, fatigued, or sleepy during daytime? 1   Has anyone observed you stop breathing in your sleep? 1   Do you have or are you being treated for high blood pressure? 0   BMI more than 35 kg/m2 1   Age over 50 years old? 1   Gender male? 1   Epworths Sleepiness Scale 1/26/2018   Sitting and reading 3   Watching TV 3   Sitting, inactive in a public place (e.g. a theatre or a meeting) 3   As a passenger in a car for an hour without a break 1   Lying down to rest in the afternoon when circumstances permit 2   Sitting and talking to someone 1   Sitting quietly after a lunch without alcohol 2   In a car, while stopped for a few minutes in traffic 1   Total score 16   Rooming 1/26/2018  "  Usual bedtime 9pm   Sleep Latency few minutes   Awakenings 4-5 times   Wake Up Time 4:30am   Weekends same   Energy Drinks 0   Coffee yes   Cola 0   Difficulty falling asleep No   Difficulty staying asleep Yes   Excessive daytime tiredness Yes   Dozing off while driving Yes   Shift Worker No   Sleep Walking? No   Sleep Talking? No   Kicking or punching? No   Restless legs symptoms No       Physical Exam:  /80  Pulse 95  Ht 5' 10\" (1.778 m)  Wt (!) 246 lb (111.6 kg)  SpO2 97%  BMI 35.3 kg/m2  BMI:Body mass index is 35.3 kg/(m^2).   GEN: NAD, obese  Head: Normocephalic.  EYES: PERRLA, EOMI  ENT: Oropharynx is clear, mallampatti class 3+ airway. Uvula is intact  Nasal mucosa is moist without erythema  Neck : Thyroid is within normal limits.  CV: Regular rate and rhythm, S1 & S2 positive.  LUNGS: Bilateral breathsounds heard.   ABDOMEN: Positive bowel sounds in all quadrants, soft, no rebound or guarding  MUSCULOSKELETAL: No leg swelling  SKIN: warm, dry, no rashes  Neurological: Alert, oriented to time, place, and person.  Psych: normal mood, normal affect     Labs/Studies:     Lab Results   Component Value Date    WBC 6.6 10/22/2013    HGB 14.1 10/22/2013    HCT 43.0 10/22/2013    MCV 84 10/22/2013     10/22/2013         Chemistry        Component Value Date/Time     10/22/2013 1110    K 4.5 10/22/2013 1110     10/22/2013 1110    CO2 26 10/22/2013 1110    BUN 14 10/22/2013 1110    CREATININE 1.12 10/22/2013 1110    GLU 87 12/08/2017 1415        Component Value Date/Time    CALCIUM 9.7 10/22/2013 1110            Lab Results   Component Value Date    FERRITIN 23 (L) 09/14/2010     Lab Results   Component Value Date    TSH 0.82 06/27/2016         Assessment and Plan:  In summary Jose Harvey is a 57 y.o. year old male here for transfer of care.  1.  Obstructive sleep apnea  I had the patient fill out a transfer of the durable medical equipment form.  Once he is able to get appointment " with our specialist he will get another mask fitting and instructions how to properly clean and maintenance his equipment.  2.  Other sleep disturbance    Patient verbalized understanding of these issues, agrees with the plan and all questions were answered today. Patient was given an opportuntity to voice any other symptoms or concerns not listed above. Patient did not have any other symptoms or concerns.      Patient told to return in one week after the sleep study is interpreted.      Fermin Morin DO  Board Certified in Internal Medicine and Sleep Medicine  [facility].    (Note created with Dragon voice recognition and unintended spelling errors and word substitutions may occur)

## 2021-06-17 NOTE — PROGRESS NOTES
[facility]  [address]  [facility] [address]  Dept: [phone]  Dept Fax: [phone]  Primary Provider: Lavonne Irving MD  Pre-op Performing Provider: LAVONNE IRVING    PREOPERATIVE EVALUATION:  Today's date: 5/4/2021    Jose Harvey is a 61 y.o. male who presents for a preoperative evaluation.    Surgical Information:  Surgery/Procedure: left knee replacement  Surgery Location: [facility]  Surgeon: Dr. Vargas  Surgery Date: 5/10/21  Time of Surgery: TBD  Where patient plans to recover: At home with family  Fax number for surgical facility: [phone]    Type of Anesthesia Anticipated: to be determined    Assessment & Plan      The proposed surgical procedure is considered INTERMEDIATE risk.    Jose was seen today for pre-op exam, knee pain and sinusitis.    Diagnoses and all orders for this visit:    Preop general physical exam  -     HM2(CBC w/o Differential)  -     Cancel: Electrocardiogram Perform and Read  -     Basic Metabolic Panel  -     Asymptomatic COVID-19 Virus (CORONAVIRUS) PCR; Future    Primary osteoarthritis of left knee    Mild intermittent asthma without complication    Obstructive sleep apnea      Risks and Recommendations:  The patient has the following additional risks and recommendations for perioperative complications:   - No identified additional risk factors other than previously addressed    Medication Instructions:  Patient is to take all scheduled medications on the day of surgery    RECOMMENDATION:  APPROVAL GIVEN to proceed with proposed procedure, without further diagnostic evaluation.  [id number]}    Subjective     HPI related to upcoming procedure:     He has symptomatic left knee osteoarthritis and has exhausted conservative means for treatment and is now scheduled for knee arthroplasty.    His medical history includes mild persistent asthma under good control with typical triggers being allergens.  He has obstructive  sleep apnea and uses a CPAP.  He otherwise takes no prescription medications.  He has no history of heart disease or lung disease other than asthma.  Reports good activity tolerance without any acute illness.    He does note a mild degree of discomfort in the right side of his jaw.  We discussed this thoroughly and examined him today noting no sign of infection.    Preop Questions 5/4/2021   Have you ever had a heart attack or stroke? No   Have you ever had surgery on your heart or blood vessels, such as a stent placement, a coronary artery bypass, or surgery on an artery in your head, neck, heart, or legs? No   Do you have chest pain with activity? No   Do you have a history of  heart failure? No   Do you currently have a cold, bronchitis or symptoms of other infection? No   Do you have a cough, shortness of breath, or wheezing? No   Do you or anyone in your family have previous history of blood clots? No   Do you or does anyone in your family have a serious bleeding problem such as prolonged bleeding following surgeries or cuts? No   Have you ever had problems with anemia or been told to take iron pills? No   Have you had any abnormal blood loss such as black, tarry or bloody stools? No   Have you ever had a blood transfusion? No   Are you willing to have a blood transfusion if it is medically needed before, during, or after your surgery? Yes   Have you or any of your relatives ever had problems with anesthesia? YES -    Do you have sleep apnea, excessive snoring or daytime drowsiness? YES - does have one   Do you have a CPAP machine? Yes   Do you have any artifical heart valves or other implanted medical devices like a pacemaker, defibrillator, or continuous glucose monitor? No   Do you have artificial joints? YES - right knee surgery 2011   Are you allergic to latex? No   [id number]}  See problem list for active medical problems.  Problems all longstanding and stable, except as noted/documented.  See ROS for  "pertinent symptoms related to these conditions.    Review of Systems  Constitutional, neuro, ENT, endocrine, pulmonary, cardiac, gastrointestinal, genitourinary, musculoskeletal, integument and psychiatric systems are negative, except as otherwise noted.    Patient Active Problem List    Diagnosis Date Noted     Fever 02/05/2015     Nasal congestion 02/05/2015     Cough 02/05/2015     Chronic Allergic Conjunctivitis      Allergic Rhinitis      Asthma      Male Erectile Disorder      Esophageal Reflux      Skin Neoplasm Of Uncertain Behavior      Obstructive sleep apnea      Fatigue      Benign Adenomatous Polyp Of The Large Intestine      No past medical history on file.  No past surgical history on file.  Current Outpatient Medications   Medication Sig Dispense Refill     albuterol (VENTOLIN HFA) 90 mcg/actuation inhaler INHALE TWO PUFFS BY MOUTH EVERY 4 HOURS AS NEEDED 36 g 5     fluticasone (FLONASE) 50 mcg/actuation nasal spray 1 spray into each nostril daily.       fluticasone propionate (FLOVENT HFA) 220 mcg/actuation inhaler INHALE 1 PUFF BY MOUTH TWICE DAILY. RINSE MOUTH AFTER USE. 1 Inhaler 6     No current facility-administered medications for this visit.      Allergies   Allergen Reactions     Metronidazole Rash     Social History     Tobacco Use     Smoking status: Never Smoker     Smokeless tobacco: Never Used   Substance Use Topics     Alcohol use: Not on file      No family history on file.  Social History     Substance and Sexual Activity   Drug Use Not on file        Objective     /76   Pulse 77   Temp 98.6  F (37  C)   Resp 18   Ht 5' 10\" (1.778 m)   Wt (!) 259 lb (117.5 kg)   SpO2 96%   BMI 37.16 kg/m    Physical Exam    GENERAL APPEARANCE: healthy, alert and no distress     EYES: EOMI,  PERRL     HENT: ear canals and TM's normal and nose and mouth without ulcers or lesions     NECK: no adenopathy, no asymmetry, masses, or scars and thyroid normal to palpation     RESP: lungs clear to " auscultation - no rales, rhonchi or wheezes     CV: regular rates and rhythm, normal S1 S2, no S3 or S4 and no murmur, click or rub     ABDOMEN:  soft, nontender, no HSM or masses and bowel sounds normal     MS: extremities normal- no gross deformities noted, no evidence of inflammation in joints, FROM in all extremities.     SKIN: no suspicious lesions or rashes     NEURO: Normal strength and tone, sensory exam grossly normal, mentation intact and speech normal     PSYCH: mentation appears normal. and affect normal/bright     LYMPHATICS: No cervical adenopathy    Recent Results (from the past 24 hour(s))   HM2(CBC w/o Differential)    Collection Time: 05/04/21  9:35 AM   Result Value Ref Range    WBC 5.4 4.0 - 11.0 thou/uL    RBC 4.99 4.40 - 6.20 mill/uL    Hemoglobin 14.3 14.0 - 18.0 g/dL    Hematocrit 41.4 40.0 - 54.0 %    MCV 83 80 - 100 fL    MCH 28.7 27.0 - 34.0 pg    MCHC 34.5 32.0 - 36.0 g/dL    RDW 12.4 11.0 - 14.5 %    Platelets 230 140 - 440 thou/uL    MPV 9.2 7.0 - 10.0 fL        PRE-OP Diagnostics:   Labs pending at this time. Results will be reviewed when available.  No EKG required, no history of coronary heart disease, significant arrhythmia, peripheral arterial disease or other structural heart disease.    REVISED CARDIAC RISK INDEX (RCRI)   The patient has the following serious cardiovascular risks for perioperative complications:   - No serious cardiac risks = 0 points    RCRI INTERPRETATION: 0 points: Class I (very low risk - 0.4% complication rate)     Signed Electronically by: Sean Dhaliwal MD    Copy of this evaluation report is provided to requesting physician.

## 2021-06-18 NOTE — PROGRESS NOTES
Patient ID: Jose Harvey is a 58 y.o. male.  /76  Pulse 76  Wt (!) 248 lb (112.5 kg)  BMI 35.58 kg/m2    Assessment/Plan:                   Diagnoses and all orders for this visit:    Travel advice encounter    Obstructive sleep apnea    Mild intermittent asthma without complication    Other orders  -     typhoid (VIVOTIF) SR capsule; Take 1 capsule by mouth every other day.  Dispense: 4 capsule; Refill: 0  -     azithromycin (ZITHROMAX) 500 MG tablet; Take 1 tablet (500 mg total) by mouth daily for 3 days. Take 1 tablet daily for 3 days.  Dispense: 3 tablet; Refill: 0         DISCUSSION  See below.  Reviewed chronic conditions and upcoming travel to [place].  Provided medications as above including typhoid vaccine and diarrhea self treatment medication appropriate for the region of the world where he will be traveling.  Subjective:     HPI    Jose Harvey is a 58 y.o. male who will be traveling to [place] for 1 month.  He is a professor at the college level.  He will be on a one-month exchange this summer.  He will be leaving in less than 1 week.  Today we reviewed travel to [place] specifically to the region where he is going.  Reviewed all of his immunizations.  He is due for typhoid immunization.  We discussed whether we would consider hepatitis B vaccination series he declines at this time.  We reviewed information available from the [employer] travel website for [place].  We spent over 15 minutes discussing this information today.  We reviewed his health history noting that he is having no significant concerns when he has an adequate supply of his medication.      Review of Systems  Complete review of systems is obtained.  Other than the specific considerations noted above complete review of systems is negative.          Objective:   Medications:  Current Outpatient Prescriptions   Medication Sig Note     albuterol (VENTOLIN HFA) 90 mcg/actuation inhaler INHALE TWO PUFFS BY MOUTH EVERY 4 HOURS AS NEEDED       amoxicillin (AMOXIL) 500 MG capsule  5/25/2018: Received from: External Pharmacy     fluticasone (FLONASE) 50 mcg/actuation nasal spray 1 spray into each nostril daily.      fluticasone (FLOVENT HFA) 220 mcg/actuation inhaler INHALE ONE PUFF TWICE DAILY, RINSE MOUTH AFTER USE      fluticasone (FLOVENT HFA) 220 mcg/actuation inhaler INHALE 1 PUFF BY MOUTH TWICE DAILY. RINSE MOUTH AFTER USE      sildenafil, antihypertensive, (REVATIO) 20 mg tablet Take 1-3 tablets (20-60 mg total) by mouth daily as needed.      azithromycin (ZITHROMAX) 500 MG tablet Take 1 tablet (500 mg total) by mouth daily for 3 days. Take 1 tablet daily for 3 days.      typhoid (VIVOTIF) SR capsule Take 1 capsule by mouth every other day.        Allergies:  Allergies   Allergen Reactions     Metronidazole        Tobacco:   reports that he has never smoked. He has never used smokeless tobacco.     Physical Exam          /76  Pulse 76  Wt (!) 248 lb (112.5 kg)  BMI 35.58 kg/m2          General Appearance:    Alert, cooperative, no distress, appears stated age

## 2021-06-19 NOTE — LETTER
Letter by Sean Dhaliwal MD at      Author: Sean Dhaliwal MD Service: -- Author Type: --    Filed:  Encounter Date: 6/16/2019 Status: (Other)         Jose Harvey  [address]  [place] [address]             June 16, 2019         Dear Mr. Harvey,    Below are the results from your recent visit:    Resulted Orders   Lipid Cascade   Result Value Ref Range    Cholesterol 185 <=199 mg/dL    Triglycerides 113 <=149 mg/dL    HDL Cholesterol 49 >=40 mg/dL    LDL Calculated 113 <=129 mg/dL    Patient Fasting > 8hrs? Yes    Glucose   Result Value Ref Range    Glucose 76 70 - 125 mg/dL    Patient Fasting > 8hrs? Yes     Narrative    Fasting Glucose reference range is 70-99 mg/dL per  American Diabetes Association (ADA) guidelines.   PSA, Annual Screen (Prostatic-Specific Antigen)   Result Value Ref Range    PSA 1.3 0.0 - 4.5 ng/mL    Narrative    Method is Abbott Prostate-Specific Antigen (PSA)  Standard-WHO 1st International (90:10)                     The cholesterol numbers are very good.    The PSA test is within the normal range.  The number compares closely to previous test results.  Based on this information there is no concern for prostate cancer.  I recommend yearly PSA testing at this time.    The blood sugar is ideal, there is no sign of diabetes.      Please call with questions or contact us using [employer].    Sincerely,        Electronically signed by Sean Dhaliwal MD

## 2021-06-20 NOTE — LETTER
Letter by Clara Luther CNP at      Author: Clara Luther CNP Service: -- Author Type: --    Filed:  Encounter Date: 7/29/2020 Status: (Other)         Jose Harvey  [address]  [facility] [address]             July 29, 2020         Dear Mr. Harvey,    Below are the results from your recent visit:    Resulted Orders   Lyme Antibody Cascade   Result Value Ref Range    Lyme Antibody Cascade 0.02 <0.90 Index Value    Narrative    Interpretation of Lyme Disease Total Antibody (IgG/IgM)  <0.90 Test Value=Negative  No detectable antibodies to B. burgdorferi. Patients  in early stages of infection may not produce  detectable levels of antibody. Antibiotic therapy  in early disease may prevent antibody production  from reaching detectable levels. Patients with  clinical history and/or symptoms suggestive of Lyme  disease but with negative test results should be  retested in 2-4 weeks.  0.90-<1.10 Test Value=Borderline  Suggests the presence of antibodies to B.  burgdorferi. Recommend repeat collection in 2-4  weeks.  >=1.10 Test Value=Positive  Indicates the presence of antibodies to B.  burgdorferi. False positive results can occur with  sera from syphilis patients. Cross-reactivity may  occur with relapsing fever, [place] Spotted  fever, other spirochetal diseases, erythematosus,  EBV infection, or CMV infection. Clinical symptoms,  epidemiology of the case and other laboratory tests  should allow for distinction of these conditions from  Lyme disease.       Your Lyme's testing is normal.      Please call with questions or contact us using [employer].    Sincerely,        Electronically signed by Clara Luther CNP

## 2021-06-22 ENCOUNTER — RECORDS - HEALTHEAST (OUTPATIENT)
Dept: ADMINISTRATIVE | Facility: OTHER | Age: 61
End: 2021-06-22

## 2021-07-03 NOTE — ADDENDUM NOTE
Addendum Note by Lavonne Irving MD at 12/8/2017  3:00 PM     Author: Lavonne Irving MD Service: -- Author Type: Physician    Filed: 12/8/2017  3:00 PM Encounter Date: 12/8/2017 Status: Signed    : Lavonne Irving MD (Physician)    Addended by: LAVONNE IRVING on: 12/8/2017 03:00 PM        Modules accepted: Orders

## 2022-01-10 ENCOUNTER — OFFICE VISIT (OUTPATIENT)
Dept: PRIMARY CARE CLINIC | Age: 62
End: 2022-01-10
Payer: COMMERCIAL

## 2022-01-10 VITALS
SYSTOLIC BLOOD PRESSURE: 138 MMHG | WEIGHT: 262 LBS | TEMPERATURE: 98.1 F | HEART RATE: 84 BPM | DIASTOLIC BLOOD PRESSURE: 76 MMHG | OXYGEN SATURATION: 96 % | HEIGHT: 69 IN | BODY MASS INDEX: 38.8 KG/M2

## 2022-01-10 DIAGNOSIS — J01.10 ACUTE NON-RECURRENT FRONTAL SINUSITIS: ICD-10-CM

## 2022-01-10 DIAGNOSIS — R50.9 FEVER, UNSPECIFIED FEVER CAUSE: Primary | ICD-10-CM

## 2022-01-10 DIAGNOSIS — R05.9 COUGH: ICD-10-CM

## 2022-01-10 LAB — SARS-COV-2, PCR: DETECTED

## 2022-01-10 PROCEDURE — 99213 OFFICE O/P EST LOW 20 MIN: CPT | Performed by: NURSE PRACTITIONER

## 2022-01-10 RX ORDER — FLUTICASONE PROPIONATE 220 UG/1
1 AEROSOL, METERED RESPIRATORY (INHALATION) 2 TIMES DAILY
Qty: 1 EACH | Refills: 11 | Status: SHIPPED | OUTPATIENT
Start: 2022-01-10

## 2022-01-10 RX ORDER — AZITHROMYCIN 250 MG/1
250 TABLET, FILM COATED ORAL SEE ADMIN INSTRUCTIONS
Qty: 6 TABLET | Refills: 0 | Status: SHIPPED | OUTPATIENT
Start: 2022-01-10 | End: 2022-01-15

## 2022-01-10 ASSESSMENT — ENCOUNTER SYMPTOMS
SORE THROAT: 1
TROUBLE SWALLOWING: 0
WHEEZING: 0
SHORTNESS OF BREATH: 0
BACK PAIN: 0
ABDOMINAL PAIN: 0

## 2022-01-10 NOTE — PATIENT INSTRUCTIONS
Patient Education        Cough: Care Instructions  Your Care Instructions     A cough is your body's response to something that bothers your throat or airways. Many things can cause a cough. You might cough because of a cold or the flu, bronchitis, or asthma. Smoking, postnasal drip, allergies, and stomach acid that backs up into your throat also can cause coughs. A cough is a symptom, not a disease. Most coughs stop when the cause, such as a cold, goes away. You can take a few steps at home to cough less and feel better. Follow-up care is a key part of your treatment and safety. Be sure to make and go to all appointments, and call your doctor if you are having problems. It's also a good idea to know your test results and keep a list of the medicines you take. How can you care for yourself at home? · Drink lots of water and other fluids. This helps thin the mucus and soothes a dry or sore throat. Honey or lemon juice in hot water or tea may ease a dry cough. · Take cough medicine as directed by your doctor. · Prop up your head on pillows to help you breathe and ease a dry cough. · Try cough drops to soothe a dry or sore throat. Cough drops don't stop a cough. Medicine-flavored cough drops are no better than candy-flavored drops or hard candy. · Do not smoke. Avoid secondhand smoke. If you need help quitting, talk to your doctor about stop-smoking programs and medicines. These can increase your chances of quitting for good. When should you call for help? Call 911 anytime you think you may need emergency care. For example, call if:    · You have severe trouble breathing. Call your doctor now or seek immediate medical care if:    · You cough up blood.     · You have new or worse trouble breathing.     · You have a new or higher fever.     · You have a new rash.    Watch closely for changes in your health, and be sure to contact your doctor if:    · You cough more deeply or more often, especially if you notice more mucus or a change in the color of your mucus.     · You have new symptoms, such as a sore throat, an earache, or sinus pain.     · You do not get better as expected. Where can you learn more? Go to [email or web address] and sign in to your [email or web address] account. Enter D279 in the [facility] box to learn more about \"Cough: Care Instructions. \"     If you do not have an account, please click on the \"Sign Up Now\" link. Current as of: July 6, 2021               Content Version: 13.1  © 2006-2021 [employer]. Care instructions adapted under license by [facility] ([facility]). If you have questions about a medical condition or this instruction, always ask your healthcare professional. [address] any warranty or liability for your use of this information. Patient Education        Fever: Care Instructions  Overview     A fever is a high body temperature. It's one way your body fights illness. A temperature of up to 102°F can be helpful, because it helps the body respond to infection. Most healthy people can have a fever as high as 103°F to 104°F for short periods of time without problems. In most cases, a fever means that you have a minor illness. Follow-up care is a key part of your treatment and safety. Be sure to make and go to all appointments, and call your doctor if you are having problems. It's also a good idea to know your test results and keep a list of the medicines you take. How can you care for yourself at home? · Drink plenty of fluids to prevent dehydration. Choose water and other clear liquids. If you have to limit fluids because of a health problem, talk with your doctor before you increase the amount of fluids you drink. · Take an over-the-counter medicine, such as acetaminophen (Tylenol), ibuprofen (Advil, Motrin) or naproxen (Aleve), to relieve your symptoms. Read and follow all instructions on the label.  No one younger than

## 2022-01-10 NOTE — PROGRESS NOTES
Beba Sanchez (:  1960) is a 59 y.o. male,Established patient, here for evaluation of the following chief complaint(s):  Nasal Congestion (all started Wednesday of last week. ), Chest Congestion, Pharyngitis, Cough, Chills, Headache, Concern For COVID-19 (was exposed the Friday before. ), and Medication Refill (Flovwnt inhaler)      ASSESSMENT/PLAN:    ICD-10-CM    1. Fever, unspecified fever cause  R50.9 fluticasone (FLOVENT HFA) 220 MCG/ACT inhaler  tylenol and motrin   Increase fluids         COVID-19   2. Cough  R05.9 fluticasone (FLOVENT HFA) 220 MCG/ACT inhaler     COVID-19   3. Acute non-recurrent frontal sinusitis  J01.10 azithromycin (ZITHROMAX) 250 MG tablet  Will treat  As worsening and time frame         No follow-ups on file. SUBJECTIVE/OBJECTIVE:  HPI    Patient is here for congestion  Started Wednesday  Had been around cousin zuhair  Noted last exposure Saturday    Reports since sore throat  Congestion  Head pressure \"bad head cold \"  In hist throat not in chest  Reports he fatigued     Reports scant aches   Perhaps fever the first few days    Was vaccinated 2021 and 2021      /76 (Site: Left Upper Arm, Position: Sitting, Cuff Size: Large Adult)   Pulse 84   Temp 98.1 °F (36.7 °C) (Temporal)   Ht 5' 9\" (1.753 m)   Wt 262 lb (118.8 kg)   SpO2 96%   BMI 38.69 kg/m²   Review of Systems   Constitutional: Positive for activity change, appetite change, fatigue and fever. HENT: Positive for congestion, postnasal drip and sore throat. Negative for sneezing and trouble swallowing. Respiratory: Negative for shortness of breath and wheezing. Cardiovascular: Negative for chest pain and palpitations. Gastrointestinal: Negative for abdominal pain. Genitourinary: Negative for difficulty urinating. Musculoskeletal: Positive for myalgias. Negative for arthralgias and back pain. Skin: Negative for rash.    Psychiatric/Behavioral: Negative for behavioral problems, self-injury and sleep disturbance. The patient is not nervous/anxious. Physical Exam  Vitals reviewed. Constitutional:       General: He is not in acute distress. Appearance: Normal appearance. He is obese. He is not ill-appearing. HENT:      Right Ear: Tympanic membrane normal. There is no impacted cerumen. Left Ear: Tympanic membrane normal. There is no impacted cerumen. Nose: Rhinorrhea present. Mouth/Throat:      Pharynx: No posterior oropharyngeal erythema. Eyes:      General:         Right eye: No discharge. Left eye: No discharge. Cardiovascular:      Rate and Rhythm: Normal rate and regular rhythm. Heart sounds: No murmur heard. Pulmonary:      Effort: Pulmonary effort is normal.      Breath sounds: Normal breath sounds. No wheezing or rhonchi. Musculoskeletal:      Right lower leg: No edema. Left lower leg: No edema. Skin:     Capillary Refill: Capillary refill takes less than 2 seconds. Findings: No rash. Neurological:      Mental Status: He is alert and oriented to person, place, and time. Psychiatric:         Mood and Affect: Mood normal.         Behavior: Behavior normal.                   An electronic signature was used to authenticate this note.     --LIAM Andrews

## 2022-01-11 ENCOUNTER — TELEPHONE (OUTPATIENT)
Dept: PRIMARY CARE CLINIC | Age: 62
End: 2022-01-11

## 2022-01-11 NOTE — TELEPHONE ENCOUNTER
Called patient, spoke with: Patient regarding the results of the patients most recent Covid test.  I advised Patient of Caro Holly recommendations.    Patient did voice understanding

## 2022-02-03 ENCOUNTER — TELEPHONE (OUTPATIENT)
Dept: PRIMARY CARE CLINIC | Age: 62
End: 2022-02-03

## 2022-02-03 RX ORDER — AMOXICILLIN AND CLAVULANATE POTASSIUM 875; 125 MG/1; MG/1
1 TABLET, FILM COATED ORAL 2 TIMES DAILY
Qty: 20 TABLET | Refills: 0 | Status: SHIPPED | OUTPATIENT
Start: 2022-02-03 | End: 2022-02-13

## 2022-02-03 NOTE — PROGRESS NOTES
Pt called c/o LLQ abd pain. Has hx of diverticulitis. He denies any fever. He has started liquid diet. He is allergic to flagyl. I advised to continue liquid diet advancing to low residue diet as tolerated. Rx sent to [facility]. He will go for urgent evaluation if fever or worsening pain.

## 2022-04-06 ENCOUNTER — OFFICE VISIT (OUTPATIENT)
Dept: FAMILY MEDICINE | Facility: CLINIC | Age: 62
End: 2022-04-06
Payer: COMMERCIAL

## 2022-04-06 VITALS
HEIGHT: 68 IN | DIASTOLIC BLOOD PRESSURE: 68 MMHG | SYSTOLIC BLOOD PRESSURE: 116 MMHG | WEIGHT: 237.5 LBS | BODY MASS INDEX: 36 KG/M2 | HEART RATE: 103 BPM | OXYGEN SATURATION: 96 %

## 2022-04-06 DIAGNOSIS — G47.33 OBSTRUCTIVE SLEEP APNEA: ICD-10-CM

## 2022-04-06 DIAGNOSIS — J45.20 MILD INTERMITTENT ASTHMA WITHOUT COMPLICATION: ICD-10-CM

## 2022-04-06 DIAGNOSIS — Z12.5 SCREENING FOR PROSTATE CANCER: ICD-10-CM

## 2022-04-06 DIAGNOSIS — Z00.00 ROUTINE HISTORY AND PHYSICAL EXAMINATION OF ADULT: Primary | ICD-10-CM

## 2022-04-06 DIAGNOSIS — Z01.110 ENCOUNTER FOR HEARING EXAMINATION FOLLOWING FAILED HEARING SCREENING: ICD-10-CM

## 2022-04-06 DIAGNOSIS — M62.838 MUSCLE SPASM: ICD-10-CM

## 2022-04-06 DIAGNOSIS — Z79.2 NEED FOR PROPHYLACTIC ANTIBIOTIC: ICD-10-CM

## 2022-04-06 DIAGNOSIS — E66.01 MORBID OBESITY (H): ICD-10-CM

## 2022-04-06 LAB
ALBUMIN SERPL-MCNC: 4.2 G/DL (ref 3.5–5)
ALP SERPL-CCNC: 67 U/L (ref 45–120)
ALT SERPL W P-5'-P-CCNC: 31 U/L (ref 0–45)
ANION GAP SERPL CALCULATED.3IONS-SCNC: 12 MMOL/L (ref 5–18)
AST SERPL W P-5'-P-CCNC: 34 U/L (ref 0–40)
BILIRUB SERPL-MCNC: 0.6 MG/DL (ref 0–1)
BUN SERPL-MCNC: 15 MG/DL (ref 8–22)
CALCIUM SERPL-MCNC: 10.5 MG/DL (ref 8.5–10.5)
CHLORIDE BLD-SCNC: 105 MMOL/L (ref 98–107)
CHOLEST SERPL-MCNC: 186 MG/DL
CO2 SERPL-SCNC: 26 MMOL/L (ref 22–31)
CREAT SERPL-MCNC: 1.18 MG/DL (ref 0.7–1.3)
FASTING STATUS PATIENT QL REPORTED: YES
GFR SERPL CREATININE-BSD FRML MDRD: 69 ML/MIN/1.73M2
GLUCOSE BLD-MCNC: 88 MG/DL (ref 70–125)
HDLC SERPL-MCNC: 47 MG/DL
HOLD SPECIMEN: NORMAL
LDLC SERPL CALC-MCNC: 119 MG/DL
POTASSIUM BLD-SCNC: 4.8 MMOL/L (ref 3.5–5)
PROT SERPL-MCNC: 7.3 G/DL (ref 6–8)
PSA SERPL-MCNC: 2.75 UG/L (ref 0–4.5)
SODIUM SERPL-SCNC: 143 MMOL/L (ref 136–145)
TRIGL SERPL-MCNC: 99 MG/DL

## 2022-04-06 PROCEDURE — G0103 PSA SCREENING: HCPCS | Performed by: FAMILY MEDICINE

## 2022-04-06 PROCEDURE — 80053 COMPREHEN METABOLIC PANEL: CPT | Performed by: FAMILY MEDICINE

## 2022-04-06 PROCEDURE — 99396 PREV VISIT EST AGE 40-64: CPT | Performed by: FAMILY MEDICINE

## 2022-04-06 PROCEDURE — 99213 OFFICE O/P EST LOW 20 MIN: CPT | Mod: 25 | Performed by: FAMILY MEDICINE

## 2022-04-06 PROCEDURE — 36415 COLL VENOUS BLD VENIPUNCTURE: CPT | Performed by: FAMILY MEDICINE

## 2022-04-06 PROCEDURE — 80061 LIPID PANEL: CPT | Performed by: FAMILY MEDICINE

## 2022-04-06 RX ORDER — ALBUTEROL SULFATE 90 UG/1
AEROSOL, METERED RESPIRATORY (INHALATION)
Qty: 18 G | Refills: 11 | Status: SHIPPED | OUTPATIENT
Start: 2022-04-06 | End: 2023-05-02

## 2022-04-06 RX ORDER — FLUTICASONE PROPIONATE 220 UG/1
1 AEROSOL, METERED RESPIRATORY (INHALATION) 2 TIMES DAILY
Qty: 12 G | Refills: 11 | Status: SHIPPED | OUTPATIENT
Start: 2022-04-06 | End: 2023-05-02

## 2022-04-06 RX ORDER — CYCLOBENZAPRINE HCL 5 MG
5 TABLET ORAL 3 TIMES DAILY PRN
Qty: 20 TABLET | Refills: 1 | Status: ON HOLD | OUTPATIENT
Start: 2022-04-06 | End: 2023-05-15

## 2022-04-06 RX ORDER — AMOXICILLIN 500 MG/1
CAPSULE ORAL
Qty: 4 CAPSULE | Refills: 3 | Status: SHIPPED | OUTPATIENT
Start: 2022-04-06 | End: 2023-05-02

## 2022-04-06 NOTE — PROGRESS NOTES
SUBJECTIVE:   CC: Jose Harvey is an 62 year old male who presents for preventative health visit.   Reviewed his health history.  Reviewed medications, allergies, family and social history.  Has history of asthma.  Typically uses inhalers during the spring and summer months.  Requests prescription for amoxicillin as he has a dental appointment tomorrow and does have history of knee and hip replacement.  He has history of diverticulitis.  Was treated for a flareup in February.  Is due for colonoscopy and plans to schedule that soon.  States that he had a gallbladder attack several months ago.  Treated this with dietary changes.  He has been working on healthy lifestyle changes and has lost nearly 15 pounds since May 2021.  He is exercising and trying to follow a healthy diet.  He has sleep apnea.  Has not been consistently using his CPAP machine due to frequent travel.  Follows with a sleep specialist in [place] where he lives.  He uses ibuprofen and Tylenol as needed.  Also takes a multivitamin, vitamin C, zinc, vitamin D and magnesium/potassium supplement.  Also takes a fiber supplement daily.  He is due for vision exam.  On review of systems he is reporting some discomfort in the left trapezius muscle.  This has been going on for about 4 months.  Has been using Tylenol and ibuprofen at night a few times a week.  Feels it is related to posture and positioning.  Has started using his mouse in his right hand and that has seemed to help.  Topical heat seems to help.  He is thinking about getting a massage.  He does notice some decreased hearing.  Reports no concerns with lower urinary tract symptoms.  Does have some soreness in one of his testicles.  This has been present for years.  No lower extremity edema.  No heart concerns.  Review of systems is otherwise negative.    Patient has been advised of split billing requirements and indicates understanding: Yes  Healthy Habits:     Getting at least 3 servings of  Calcium per day:  Yes    Bi-annual eye exam:  NO    Dental care twice a year:  Yes    Sleep apnea or symptoms of sleep apnea:  Sleep apnea    Diet:  Regular (no restrictions)    Frequency of exercise:  4-5 days/week    Duration of exercise:  45-60 minutes    Taking medications regularly:  Yes    Medication side effects:  Muscle aches    PHQ-2 Total Score: 0    Additional concerns today:  No              Today's PHQ-2 Score:   PHQ-2 ( 1999 Pfizer) 4/6/2022   Q1: Little interest or pleasure in doing things 0   Q2: Feeling down, depressed or hopeless 0   PHQ-2 Score 0   Q1: Little interest or pleasure in doing things Not at all   Q2: Feeling down, depressed or hopeless Not at all   PHQ-2 Score 0       Abuse: Current or Past(Physical, Sexual or Emotional)- No  Do you feel safe in your environment? Yes    Have you ever done Advance Care Planning? (For example, a Health Directive, POLST, or a discussion with a medical provider or your loved ones about your wishes): No, advance care planning information given to patient to review.  Patient declined advance care planning discussion at this time.    Social History     Tobacco Use     Smoking status: Never Smoker     Smokeless tobacco: Never Used   Substance Use Topics     Alcohol use: Not on file         Alcohol Use 4/6/2022   Prescreen: >3 drinks/day or >7 drinks/week? No       Last PSA:   Prostate Specific Antigen Screen   Date Value Ref Range Status   06/14/2019 1.3 0.0 - 4.5 ng/mL Final       Reviewed orders with patient. Reviewed health maintenance and updated orders accordingly - Yes  Current Outpatient Medications   Medication Sig Dispense Refill     albuterol (PROAIR HFA/PROVENTIL HFA/VENTOLIN HFA) 108 (90 Base) MCG/ACT inhaler [ALBUTEROL (VENTOLIN HFA) 90 MCG/ACTUATION INHALER] INHALE TWO PUFFS BY MOUTH EVERY 4 HOURS AS NEEDED 18 g 11     amoxicillin (AMOXIL) 500 MG capsule Take 4 capsules by mouth 1 hour before dental appointment 4 capsule 3     cyclobenzaprine  "(FLEXERIL) 5 MG tablet Take 1 tablet (5 mg) by mouth 3 times daily as needed for muscle spasms 20 tablet 1     fluticasone (FLONASE) 50 mcg/actuation nasal spray [FLUTICASONE (FLONASE) 50 MCG/ACTUATION NASAL SPRAY] 1 spray into each nostril daily.       fluticasone (FLOVENT HFA) 220 MCG/ACT inhaler Inhale 1 puff into the lungs 2 times daily 12 g 11       Reviewed and updated as needed this visit by clinical staff   Tobacco  Allergies  Meds              Reviewed and updated as needed this visit by Provider    Allergies                  Review of Systems  See HPI    OBJECTIVE:   /68 (BP Location: Left arm, Cuff Size: Adult Regular)   Pulse 103   Ht 1.727 m (5' 8\")   Wt 107.7 kg (237 lb 8 oz)   SpO2 96%   BMI 36.11 kg/m      Physical Exam  GENERAL: healthy, alert and no distress  EYES: Eyes grossly normal to inspection, PERRL and conjunctivae and sclerae normal  HENT: normal cephalic/atraumatic and ear canals and TM's normal  RESP: lungs clear to auscultation - no rales, rhonchi or wheezes  CV: regular rate and rhythm, normal S1 S2, no S3 or S4, no murmur, click or rub, no peripheral edema and peripheral pulses strong  ABDOMEN: soft, nontender, no hepatosplenomegaly, no masses and bowel sounds normal  MS: neck exam shows tenderness and spasm in left trapezius muscle  SKIN: no suspicious lesions or rashes  NEURO: Normal strength and tone, mentation intact and speech normal  PSYCH: mentation appears normal, affect normal/bright        ASSESSMENT/PLAN:   Jose was seen today for physical and neck pain.    Diagnoses and all orders for this visit:    Routine history and physical examination of adult  -     Lipid panel reflex to direct LDL Fasting; Future  -     Comprehensive metabolic panel (BMP + Alb, Alk Phos, ALT, AST, Total. Bili, TP); Future  -     Lipid panel reflex to direct LDL Fasting  -     Comprehensive metabolic panel (BMP + Alb, Alk Phos, ALT, AST, Total. Bili, TP)  Encourage vision exam.  " "Continue with regular dental exams.  Continue regular exercise, healthy diet and weight loss efforts.  Check lipids and comprehensive metabolic panel today.  Discussed shingles vaccine.  Encouraged him to check with insurance on coverage.  Follow-up in 1 year for annual physical    Mild intermittent asthma without complication  -     albuterol (PROAIR HFA/PROVENTIL HFA/VENTOLIN HFA) 108 (90 Base) MCG/ACT inhaler; [ALBUTEROL (VENTOLIN HFA) 90 MCG/ACTUATION INHALER] INHALE TWO PUFFS BY MOUTH EVERY 4 HOURS AS NEEDED  -     fluticasone (FLOVENT HFA) 220 MCG/ACT inhaler; Inhale 1 puff into the lungs 2 times daily  -Asthma control test score is 21.  Continue current inhalers.  Refill provided.  Asthma action plan completed    Morbid obesity (H)  Continue to work on healthy lifestyle changes and weight loss efforts    Need for prophylactic antibiotic  -     amoxicillin (AMOXIL) 500 MG capsule; Take 4 capsules by mouth 1 hour before dental appointment    Screening for prostate cancer  -     PSA, screen; Future  -     PSA, screen    Encounter for hearing examination following failed hearing screening  -     Adult Audiology Referral; Future    Muscle spasm  -     cyclobenzaprine (FLEXERIL) 5 MG tablet; Take 1 tablet (5 mg) by mouth 3 times daily as needed for muscle spasms  -Recommend topical heat.  Suggest massage.  Recommend good posture when seated at desk.  Provided prescription for cyclobenzaprine.  Counseled on use of medication.  Continue Tylenol and ibuprofen as needed          Patient has been advised of split billing requirements and indicates understanding: Yes    COUNSELING:   Reviewed preventive health counseling, as reflected in patient instructions       Regular exercise       Healthy diet/nutrition       Vision screening    Estimated body mass index is 36.11 kg/m  as calculated from the following:    Height as of this encounter: 1.727 m (5' 8\").    Weight as of this encounter: 107.7 kg (237 lb 8 oz).   "   Weight management plan: Discussed healthy diet and exercise guidelines    He reports that he has never smoked. He has never used smokeless tobacco.      Counseling Resources:  ATP IV Guidelines  Pooled Cohorts Equation Calculator  FRAX Risk Assessment  ICSI Preventive Guidelines  Dietary Guidelines for Americans, 2010  USDA's MyPlate  ASA Prophylaxis  Lung CA Screening    Preethi Godoy MD  [facility]

## 2022-04-06 NOTE — LETTER
April 7, 2022      Jose Harvey  [address] [facility] [address]        Dear ,    We are writing to inform you of your test results.    Everything looks good!    Resulted Orders   PSA, screen   Result Value Ref Range    Prostate Specific Antigen Screen 2.75 0.00 - 4.50 ug/L    Narrative    Assay Method is Abbott Prostate-Specific Antigen (PSA)  Standard-WHO 1st International (90:10)   Lipid panel reflex to direct LDL Fasting   Result Value Ref Range    Cholesterol 186 <=199 mg/dL    Triglycerides 99 <=149 mg/dL    Direct Measure HDL 47 >=40 mg/dL      Comment:      HDL Cholesterol Reference Range:     0-2 years:   No reference ranges established for patients under 2 years old  [facility].    2-8 years:  Greater than 45 mg/dL     18 years and older:   Female: Greater than or equal to 50 mg/dL   Male:   Greater than or equal to 40 mg/dL    LDL Cholesterol Calculated 119 <=129 mg/dL    Patient Fasting > 8hrs? Yes    Comprehensive metabolic panel (BMP + Alb, Alk Phos, ALT, AST, Total. Bili, TP)   Result Value Ref Range    Sodium 143 136 - 145 mmol/L    Potassium 4.8 3.5 - 5.0 mmol/L    Chloride 105 98 - 107 mmol/L    Carbon Dioxide (CO2) 26 22 - 31 mmol/L    Anion Gap 12 5 - 18 mmol/L    Urea Nitrogen 15 8 - 22 mg/dL    Creatinine 1.18 0.70 - 1.30 mg/dL    Calcium 10.5 8.5 - 10.5 mg/dL    Glucose 88 70 - 125 mg/dL    Alkaline Phosphatase 67 45 - 120 U/L    AST 34 0 - 40 U/L    ALT 31 0 - 45 U/L    Protein Total 7.3 6.0 - 8.0 g/dL    Albumin 4.2 3.5 - 5.0 g/dL    Bilirubin Total 0.6 0.0 - 1.0 mg/dL    GFR Estimate 69 >60 mL/min/1.73m2      Comment:      Effective December 21, 2021 eGFRcr in adults is calculated using the 2021 CKD-EPI creatinine equation which includes age and gender (Jordan et al., NEJM, DOI: 10.1056/XBHPck3165810)       If you have any questions or concerns, please call the clinic at the number listed above.       Sincerely,      Preethi Godoy,  MD

## 2022-04-06 NOTE — LETTER
My Asthma Action Plan    Name: Jose Harvey   YOB: 1960  Date: 4/6/2022   My doctor: Preethi Godoy MD   My clinic: [facility]        My Control Medicine: Fluticasone propionate (Flovent HFA) - 220 mcg bid  My Rescue Medicine: Albuterol (Proair/Ventolin/Proventil HFA) 2-4 puffs EVERY 4 HOURS as needed. Use a spacer if recommended by your provider.   My Asthma Severity:   Mild Persistent  Know your asthma triggers: seasonal changes               GREEN ZONE   Good Control    I feel good    No cough or wheeze    Can work, sleep and play without asthma symptoms       Take your asthma control medicine every day.     1. If exercise triggers your asthma, take your rescue medication    15 minutes before exercise or sports, and    During exercise if you have asthma symptoms  2. Spacer to use with inhaler: If you have a spacer, make sure to use it with your inhaler             YELLOW ZONE Getting Worse  I have ANY of these:    I do not feel good    Cough or wheeze    Chest feels tight    Wake up at night   1. Keep taking your Green Zone medications  2. Start taking your rescue medicine:    every 20 minutes for up to 1 hour. Then every 4 hours for 24-48 hours.  3. If you stay in the Yellow Zone for more than 12-24 hours, contact your doctor.  4. If you do not return to the Green Zone in 12-24 hours or you get worse, start taking your oral steroid medicine if prescribed by your provider.           RED ZONE Medical Alert - Get Help  I have ANY of these:    I feel awful    Medicine is not helping    Breathing getting harder    Trouble walking or talking    Nose opens wide to breathe       1. Take your rescue medicine NOW  2. If your provider has prescribed an oral steroid medicine, start taking it NOW  3. Call your doctor NOW  4. If you are still in the Red Zone after 20 minutes and you have not reached your doctor:    Take your rescue medicine again and    Call 911 or go to the  emergency room right away    See your regular doctor within 2 weeks of an Emergency Room or Urgent Care visit for follow-up treatment.          Annual Reminders:  Meet with Asthma Educator,  Flu Shot in the Fall, consider Pneumonia Vaccination for patients with asthma (aged 19 and older).    Pharmacy: [id number] [address] [facility] [address] [facility] [address]    Electronically signed by Preethi Godoy MD   Date: 04/06/22                      Asthma Triggers  How To Control Things That Make Your Asthma Worse    Triggers are things that make your asthma worse.  Look at the list below to help you find your triggers and what you can do about them.  You can help prevent asthma flare-ups by staying away from your triggers.      Trigger                                                          What you can do   Cigarette Smoke  Tobacco smoke can make asthma worse. Do not allow smoking in your home, car or around you.  Be sure no one smokes at a child s day care or school.  If you smoke, ask your health care provider for ways to help you quit.  Ask family members to quit too.  Ask your health care provider for a referral to Quit Plan to help you quit smoking, or call 4-021-978-PLAN.     Colds, Flu, Bronchitis  These are common triggers of asthma. Wash your hands often.  Don t touch your eyes, nose or mouth.  Get a flu shot every year.     Dust Mites  These are tiny bugs that live in cloth or carpet. They are too small to see. Wash sheets and blankets in hot water every week.   Encase pillows and mattress in dust mite proof covers.  Avoid having carpet if you can. If you have carpet, vacuum weekly.   Use a dust mask and HEPA vacuum.   Pollen and Outdoor Mold  Some people are allergic to trees, grass, or weed pollen, or molds. Try to keep your windows closed.  Limit time out doors when pollen count is high.   Ask you health care provider about taking medicine during allergy season.     Animal  Dander  Some people are allergic to skin flakes, urine or saliva from pets with fur or feathers. Keep pets with fur or feathers out of your home.    If you can t keep the pet outdoors, then keep the pet out of your bedroom.  Keep the bedroom door closed.  Keep pets off cloth furniture and away from stuffed toys.     Mice, Rats, and Cockroaches   Some people are allergic to the waste from these pests.   Cover food and garbage.  Clean up spills and food crumbs.  Store grease in the refrigerator.   Keep food out of the bedroom.   Indoor Mold  This can be a trigger if your home has high moisture. Fix leaking faucets, pipes, or other sources of water.   Clean moldy surfaces.  Dehumidify basement if it is damp and smelly.   Smoke, Strong Odors, and Sprays  These can reduce air quality. Stay away from strong odors and sprays, such as perfume, powder, hair spray, paints, smoke incense, paint, cleaning products, candles and new carpet.   Exercise or Sports  Some people with asthma have this trigger. Be active!  Ask your doctor about taking medicine before sports or exercise to prevent symptoms.    Warm up for 5-10 minutes before and after sports or exercise.     Other Triggers of Asthma  Cold air:  Cover your nose and mouth with a scarf.  Sometimes laughing or crying can be a trigger.  Some medicines and food can trigger asthma.

## 2023-01-16 NOTE — PROGRESS NOTES
Order for Durable Medical Equipment was processed and equipment ordered.   DME provider: JEANA to Danielle from [facility]  Date Faxed: 01/29/2018  Ordering Provider: Dr. Morin  Equipment ordered: JEANA...     Patient states it was taken care of. Patient needed immunization record

## 2023-02-21 ENCOUNTER — TRANSFERRED RECORDS (OUTPATIENT)
Dept: HEALTH INFORMATION MANAGEMENT | Facility: CLINIC | Age: 63
End: 2023-02-21

## 2023-03-06 RX ORDER — ACETAMINOPHEN 500 MG
500-1000 TABLET ORAL EVERY 6 HOURS PRN
Status: ON HOLD | COMMUNITY
End: 2023-05-15

## 2023-03-09 ENCOUNTER — TELEPHONE (OUTPATIENT)
Dept: FAMILY MEDICINE | Facility: CLINIC | Age: 63
End: 2023-03-09
Payer: COMMERCIAL

## 2023-03-09 DIAGNOSIS — M16.11 PRIMARY OSTEOARTHRITIS OF RIGHT HIP: Primary | ICD-10-CM

## 2023-03-09 NOTE — TELEPHONE ENCOUNTER
Order/Referral Request    Who is requesting: [facility]    Orders being requested: Physical Therapy    Reason service is needed/diagnosis: Patient is unable to have hip replacement surgery.  [employer] required PT to be completed first.  [employer] also requires referral/order for PT    Right Hip Osteo Arthritis     When are orders needed by: At earliest convenience    Has this been discussed with Provider: Unclear, patient has communicated to Esau that Dr. Dhaliwal is PCP    Does patient have a preference on a Group/Provider/Facility? [facility] can be reached at     Does patient have an appointment scheduled?: No    Where to send orders: Fax  [phone]

## 2023-03-09 NOTE — TELEPHONE ENCOUNTER
Please speak to patient directly.  He should make his surgeon aware that his insurance company requires that he have physical therapy before they will cover the procedure.  If the patient is in agreement with physical therapy I can place the referral.

## 2023-03-10 NOTE — TELEPHONE ENCOUNTER
He replied back to use via a [employer] [facility] message. He is interested in a referral, which he needs to try physical therapy prior to surgery.

## 2023-03-13 ENCOUNTER — MYC MEDICAL ADVICE (OUTPATIENT)
Dept: FAMILY MEDICINE | Facility: CLINIC | Age: 63
End: 2023-03-13
Payer: COMMERCIAL

## 2023-03-13 NOTE — TELEPHONE ENCOUNTER
General Call    Contacts       Type Contact Phone/Fax    03/09/2023 12:34 PM CST Phone (Incoming) Esau with [facility] (Other) [phone]    03/13/2023 03:45 PM CDT Phone (Incoming) Esau with [facility] (Other) [phone]        Reason for Call:  Failed Fax    What are your questions or concerns:  Received cover sheet only - Requesting referral is re-faxed to

## 2023-03-13 NOTE — TELEPHONE ENCOUNTER
Spoke w/Arsh, we went over his request and questions. He is choosing to have PT completed in [place] prior to his hip surgery being approved with Dr Jordan (per his insurance companies request).     Arsh stated that [employer] said he would need an insurance referral sent to them from Dr Sean Dhaliwal for PT services. That is all he is asking for.    I explained to him that we do not place insurance referrals for out of state facilities. He will need to reach out to [employer] and ask them what his level of benefits will be for services out of state ([place]). Arsh stated he still is confused and does not understand why if his insurance and Dr Dumont's office ([facility]) states that Dr Dhaliwal just needs to place an insurance referral and he has why it can not be sent over to [employer]. I explained that the referral Dr Dhaliwal placed is only a recommendation/referral/order, this does not go over to the insurance company. This recommendation/referral/order request falls to the referral coordinator team and we work the referral piece of it.     Again, I explained that he needs to contact his insurance and ask them what his level of benefits will be for services in [place] which is out of state from his PC and Orthopedics providers. Arsh stated he still does not understand and will do what I recommended him to do.

## 2023-04-20 ENCOUNTER — PATIENT OUTREACH (OUTPATIENT)
Dept: CARE COORDINATION | Facility: CLINIC | Age: 63
End: 2023-04-20
Payer: COMMERCIAL

## 2023-04-28 PROBLEM — F52.8 PSYCHOSEXUAL DYSFUNCTION WITH INHIBITED SEXUAL EXCITEMENT: Status: ACTIVE | Noted: 2023-04-28

## 2023-04-30 ENCOUNTER — HEALTH MAINTENANCE LETTER (OUTPATIENT)
Age: 63
End: 2023-04-30

## 2023-04-30 ASSESSMENT — ASTHMA QUESTIONNAIRES
QUESTION_3 LAST FOUR WEEKS HOW OFTEN DID YOUR ASTHMA SYMPTOMS (WHEEZING, COUGHING, SHORTNESS OF BREATH, CHEST TIGHTNESS OR PAIN) WAKE YOU UP AT NIGHT OR EARLIER THAN USUAL IN THE MORNING: NOT AT ALL
QUESTION_2 LAST FOUR WEEKS HOW OFTEN HAVE YOU HAD SHORTNESS OF BREATH: ONCE OR TWICE A WEEK
ACT_TOTALSCORE: 23
QUESTION_1 LAST FOUR WEEKS HOW MUCH OF THE TIME DID YOUR ASTHMA KEEP YOU FROM GETTING AS MUCH DONE AT WORK, SCHOOL OR AT HOME: NONE OF THE TIME
QUESTION_5 LAST FOUR WEEKS HOW WOULD YOU RATE YOUR ASTHMA CONTROL: COMPLETELY CONTROLLED
QUESTION_4 LAST FOUR WEEKS HOW OFTEN HAVE YOU USED YOUR RESCUE INHALER OR NEBULIZER MEDICATION (SUCH AS ALBUTEROL): ONCE A WEEK OR LESS
ACT_TOTALSCORE: 23

## 2023-05-02 ENCOUNTER — OFFICE VISIT (OUTPATIENT)
Dept: FAMILY MEDICINE | Facility: CLINIC | Age: 63
End: 2023-05-02
Payer: COMMERCIAL

## 2023-05-02 VITALS
DIASTOLIC BLOOD PRESSURE: 76 MMHG | TEMPERATURE: 98.1 F | RESPIRATION RATE: 18 BRPM | HEART RATE: 65 BPM | OXYGEN SATURATION: 97 % | HEIGHT: 70 IN | BODY MASS INDEX: 27.72 KG/M2 | WEIGHT: 193.6 LBS | SYSTOLIC BLOOD PRESSURE: 124 MMHG

## 2023-05-02 DIAGNOSIS — Z79.2 NEED FOR PROPHYLACTIC ANTIBIOTIC: ICD-10-CM

## 2023-05-02 DIAGNOSIS — Z01.818 PREOP GENERAL PHYSICAL EXAM: Primary | ICD-10-CM

## 2023-05-02 DIAGNOSIS — G47.33 OBSTRUCTIVE SLEEP APNEA: ICD-10-CM

## 2023-05-02 DIAGNOSIS — M16.11 PRIMARY OSTEOARTHRITIS OF RIGHT HIP: ICD-10-CM

## 2023-05-02 DIAGNOSIS — J45.20 MILD INTERMITTENT ASTHMA WITHOUT COMPLICATION: ICD-10-CM

## 2023-05-02 LAB
ERYTHROCYTE [DISTWIDTH] IN BLOOD BY AUTOMATED COUNT: 12.5 % (ref 10–15)
HCT VFR BLD AUTO: 38.6 % (ref 40–53)
HGB BLD-MCNC: 13.1 G/DL (ref 13.3–17.7)
HOLD SPECIMEN: NORMAL
MCH RBC QN AUTO: 29.6 PG (ref 26.5–33)
MCHC RBC AUTO-ENTMCNC: 33.9 G/DL (ref 31.5–36.5)
MCV RBC AUTO: 87 FL (ref 78–100)
PLATELET # BLD AUTO: 229 10E3/UL (ref 150–450)
RBC # BLD AUTO: 4.43 10E6/UL (ref 4.4–5.9)
WBC # BLD AUTO: 5.6 10E3/UL (ref 4–11)

## 2023-05-02 PROCEDURE — 99214 OFFICE O/P EST MOD 30 MIN: CPT | Performed by: FAMILY MEDICINE

## 2023-05-02 PROCEDURE — 36415 COLL VENOUS BLD VENIPUNCTURE: CPT | Performed by: FAMILY MEDICINE

## 2023-05-02 PROCEDURE — 85027 COMPLETE CBC AUTOMATED: CPT | Performed by: FAMILY MEDICINE

## 2023-05-02 RX ORDER — ALBUTEROL SULFATE 90 UG/1
AEROSOL, METERED RESPIRATORY (INHALATION)
Qty: 18 G | Refills: 11 | Status: SHIPPED | OUTPATIENT
Start: 2023-05-02

## 2023-05-02 RX ORDER — AMOXICILLIN 500 MG/1
CAPSULE ORAL
Qty: 4 CAPSULE | Refills: 3 | Status: SHIPPED | OUTPATIENT
Start: 2023-05-02

## 2023-05-02 RX ORDER — FLUTICASONE PROPIONATE 220 UG/1
1 AEROSOL, METERED RESPIRATORY (INHALATION) 2 TIMES DAILY
Qty: 12 G | Refills: 11 | Status: ON HOLD | OUTPATIENT
Start: 2023-05-02 | End: 2023-05-15

## 2023-05-02 RX ORDER — FLUTICASONE PROPIONATE 220 UG/1
1 AEROSOL, METERED RESPIRATORY (INHALATION) 2 TIMES DAILY
Qty: 12 G | Refills: 11 | Status: SHIPPED | OUTPATIENT
Start: 2023-05-02 | End: 2023-07-03

## 2023-05-02 ASSESSMENT — PAIN SCALES - GENERAL: PAINLEVEL: MODERATE PAIN (4)

## 2023-05-02 NOTE — H&P (VIEW-ONLY)
[facility]  [address] [place] [address] [address]  [facility] [address]  Phone: [phone]  Fax: [phone]  Primary Provider: Lavonne Irving  Pre-op Performing Provider: LAVONNE IRVING      PREOPERATIVE EVALUATION:  Today's date: 5/2/2023    Jose Harvey is a 63 year old male who presents for a preoperative evaluation.               Surgical Information:  Surgery/Procedure: RIGHT TOTAL HIP ARTHROPLASTY POSTERIOR  Surgery Location:   Surgeon: Dr Jordan  Surgery Date: 5/15/23  Time of Surgery: 10:10 am  Where patient plans to recover: At home with family  Fax number for surgical facility: Note does not need to be faxed, will be available electronically in Epic.    Assessment & Plan     The proposed surgical procedure is considered INTERMEDIATE risk.    Jose was seen today for pre-op exam and referral.    Diagnoses and all orders for this visit:    Preop general physical exam  -     CBC with platelets  -     Extra Tube    Primary osteoarthritis of right hip    Mild intermittent asthma without complication  -     fluticasone (FLOVENT HFA) 220 MCG/ACT inhaler; Inhale 1 puff into the lungs 2 times daily  -     albuterol (PROAIR HFA/PROVENTIL HFA/VENTOLIN HFA) 108 (90 Base) MCG/ACT inhaler; [ALBUTEROL (VENTOLIN HFA) 90 MCG/ACTUATION INHALER] INHALE TWO PUFFS BY MOUTH EVERY 4 HOURS AS NEEDED  -     fluticasone (FLOVENT HFA) 220 MCG/ACT inhaler; Inhale 1 puff into the lungs 2 times daily    Need for prophylactic antibiotic  -     amoxicillin (AMOXIL) 500 MG capsule; Take 4 capsules by mouth 1 hour before dental appointment    Obstructive sleep apnea       - No identified additional risk factors other than previously addressed    Antiplatelet or Anticoagulation Medication Instructions:   - Patient is on no antiplatelet or anticoagulation medications.    Additional Medication Instructions:  Patient is to take all scheduled medications on the day of surgery    RECOMMENDATION:  APPROVAL GIVEN to proceed  with proposed procedure, without further diagnostic evaluation.      Subjective     HPI related to upcoming procedure:     He has symptomatic right hip osteoarthritis and is exhausted more conservative means for treatment and is now scheduled for knee arthroplasty.    His medical history includes mild intermittent asthma, the asthma is under good control.  Typical triggers have been allergens.  He has obstructive sleep apnea and uses a CPAP.  He has no history of heart disease.  He has good activity tolerance without any acute illness.    In the past he has suffered from rather severe nausea associated with anesthesia but more recently with premedication he has had no significant issues with nausea.        4/30/2023     1:11 PM   Preop Questions   1. Have you ever had a heart attack or stroke? No   2. Have you ever had surgery on your heart or blood vessels, such as a stent placement, a coronary artery bypass, or surgery on an artery in your head, neck, heart, or legs? No   3. Do you have chest pain with activity? No   4. Do you have a history of  heart failure? No   5. Do you currently have a cold, bronchitis or symptoms of other infection? No   6. Do you have a cough, shortness of breath, or wheezing? No   7. Do you or anyone in your family have previous history of blood clots? No   8. Do you or does anyone in your family have a serious bleeding problem such as prolonged bleeding following surgeries or cuts? No   9. Have you ever had problems with anemia or been told to take iron pills? No   10. Have you had any abnormal blood loss such as black, tarry or bloody stools? No   11. Have you ever had a blood transfusion? No   12. Are you willing to have a blood transfusion if it is medically needed before, during, or after your surgery? Yes   13. Have you or any of your relatives ever had problems with anesthesia? YES - causes nausea   14. Do you have sleep apnea, excessive snoring or daytime drowsiness? YES -uses  CPAP see above   14a. Do you have a CPAP machine? No   15. Do you have any artifical heart valves or other implanted medical devices like a pacemaker, defibrillator, or continuous glucose monitor? No   16. Do you have artificial joints? YES -right knee   17. Are you allergic to latex? No       Health Care Directive:  Patient does not have a Health Care Directive or Living Will:     Preoperative Review of :        Status of Chronic Conditions:  See problem list for active medical problems.  Problems all longstanding and stable, except as noted/documented.  See ROS for pertinent symptoms related to these conditions.      Review of Systems  Complete review of systems is obtained.  Other than the specific considerations noted above complete review of systems is negative.      Patient Active Problem List    Diagnosis Date Noted     Psychosexual dysfunction with inhibited sexual excitement 04/28/2023     Priority: Medium     Formatting of this note might be different from the original.  Created by Conversion       Morbid obesity (H) 04/06/2022     Priority: Medium     Asthma      Priority: Medium     Created by Conversion         Obstructive sleep apnea      Priority: Medium     Created by Conversion         Allergic Rhinitis      Priority: Medium     Created by Conversion  Replacement Utility updated for latest IMO load         Fever 02/05/2015     Priority: Medium     Nasal congestion 02/05/2015     Priority: Medium     Cough 02/05/2015     Priority: Medium     Chronic Allergic Conjunctivitis      Priority: Medium     Created by Conversion         Male Erectile Disorder      Priority: Medium     Created by Conversion         Esophageal Reflux      Priority: Medium     Created by Conversion         Skin Neoplasm Of Uncertain Behavior      Priority: Medium     Created by Conversion         Fatigue      Priority: Medium     Created by Conversion         Benign Adenomatous Polyp Of The Large Intestine      Priority:  "Medium     Created by Conversion          Past Medical History:   Diagnosis Date     Arthritis      PONV (postoperative nausea and vomiting)      Uncomplicated asthma      Past Surgical History:   Procedure Laterality Date     ORTHOPEDIC SURGERY       Current Outpatient Medications   Medication Sig Dispense Refill     acetaminophen (TYLENOL) 500 MG tablet Take 500-1,000 mg by mouth every 6 hours as needed for mild pain       albuterol (PROAIR HFA/PROVENTIL HFA/VENTOLIN HFA) 108 (90 Base) MCG/ACT inhaler [ALBUTEROL (VENTOLIN HFA) 90 MCG/ACTUATION INHALER] INHALE TWO PUFFS BY MOUTH EVERY 4 HOURS AS NEEDED 18 g 11     amoxicillin (AMOXIL) 500 MG capsule Take 4 capsules by mouth 1 hour before dental appointment 4 capsule 3     fluticasone (FLOVENT HFA) 220 MCG/ACT inhaler Inhale 1 puff into the lungs 2 times daily 12 g 11     fluticasone (FLOVENT HFA) 220 MCG/ACT inhaler Inhale 1 puff into the lungs 2 times daily 12 g 11     cyclobenzaprine (FLEXERIL) 5 MG tablet Take 1 tablet (5 mg) by mouth 3 times daily as needed for muscle spasms (Patient not taking: Reported on 5/2/2023) 20 tablet 1     fluticasone (FLONASE) 50 mcg/actuation nasal spray [FLUTICASONE (FLONASE) 50 MCG/ACTUATION NASAL SPRAY] 1 spray into each nostril daily. (Patient not taking: Reported on 5/2/2023)         Allergies   Allergen Reactions     Metronidazole Rash        Social History     Tobacco Use     Smoking status: Never     Smokeless tobacco: Never   Vaping Use     Vaping status: Never Used   Substance Use Topics     Alcohol use: Not Currently       History   Drug Use Unknown         Objective     /76   Pulse 65   Temp 98.1  F (36.7  C)   Resp 18   Ht 1.778 m (5' 10\")   Wt 87.8 kg (193 lb 9.6 oz)   SpO2 97%   BMI 27.78 kg/m      Physical Exam        General Appearance:    Alert, cooperative, no distress   Eyes:   No scleral icterus or conjunctival irritation       Ears:    Normal TM's and external ear canals, both ears   Throat:   " Lips, mucosa, and tongue normal; teeth and gums normal   Neck:   Supple, symmetrical, trachea midline, no adenopathy;        thyroid:  No enlargement/tenderness/nodules   Lungs:     Clear to auscultation bilaterally, respirations unlabored, no wheezes or crackles   Heart:    Regular rate and rhythm,  No murmur   Abdomen:    Soft, no distention, no tenderness on palpation, no masses, no organomegaly     Extremities:  No edema, no joint swelling or redness, no evidence of any injuries   Skin:  No concerning skin findings, no suspicious moles, no rashes   Neurologic:  On gross examination there is no motor or sensory deficit.  Patient walks with a normal gait           Recent Labs   Lab Test 04/06/22  1623 05/04/21  0935   HGB  --  14.3   PLT  --  230    140   POTASSIUM 4.8 4.0   CR 1.18 0.97        Diagnostics:     Recent Results (from the past 24 hour(s))   CBC with platelets    Collection Time: 05/02/23  4:13 PM   Result Value Ref Range    WBC Count 5.6 4.0 - 11.0 10e3/uL    RBC Count 4.43 4.40 - 5.90 10e6/uL    Hemoglobin 13.1 (L) 13.3 - 17.7 g/dL    Hematocrit 38.6 (L) 40.0 - 53.0 %    MCV 87 78 - 100 fL    MCH 29.6 26.5 - 33.0 pg    MCHC 33.9 31.5 - 36.5 g/dL    RDW 12.5 10.0 - 15.0 %    Platelet Count 229 150 - 450 10e3/uL       No EKG required, no history of coronary heart disease, significant arrhythmia, peripheral arterial disease or other structural heart disease.    Revised Cardiac Risk Index (RCRI):  The patient has the following serious cardiovascular risks for perioperative complications:   - No serious cardiac risks = 0 points     RCRI Interpretation: 0 points: Class I (very low risk - 0.4% complication rate)    Wt Readings from Last 3 Encounters:   05/02/23 87.8 kg (193 lb 9.6 oz)   04/06/22 107.7 kg (237 lb 8 oz)   05/04/21 117.5 kg (259 lb)        BP Readings from Last 6 Encounters:   05/02/23 124/76   04/06/22 116/68   05/04/21 126/76   07/28/20 (!) 140/90   11/18/19 128/76        Signed  Electronically by: Sean Dhaliwal MD, MD  Copy of this evaluation report is provided to requesting physician.

## 2023-05-02 NOTE — PROGRESS NOTES
[facility]  [address] [place] [address] [address]  [facility] [address]  Phone: [phone]  Fax: [phone]  Primary Provider: Lavonne Irving  Pre-op Performing Provider: LAVONNE IRVING      PREOPERATIVE EVALUATION:  Today's date: 5/2/2023    Jose Harvey is a 63 year old male who presents for a preoperative evaluation.               Surgical Information:  Surgery/Procedure: RIGHT TOTAL HIP ARTHROPLASTY POSTERIOR  Surgery Location:   Surgeon: Dr Jordan  Surgery Date: 5/15/23  Time of Surgery: 10:10 am  Where patient plans to recover: At home with family  Fax number for surgical facility: Note does not need to be faxed, will be available electronically in Epic.    Assessment & Plan     The proposed surgical procedure is considered INTERMEDIATE risk.    Jose was seen today for pre-op exam and referral.    Diagnoses and all orders for this visit:    Preop general physical exam  -     CBC with platelets  -     Extra Tube    Primary osteoarthritis of right hip    Mild intermittent asthma without complication  -     fluticasone (FLOVENT HFA) 220 MCG/ACT inhaler; Inhale 1 puff into the lungs 2 times daily  -     albuterol (PROAIR HFA/PROVENTIL HFA/VENTOLIN HFA) 108 (90 Base) MCG/ACT inhaler; [ALBUTEROL (VENTOLIN HFA) 90 MCG/ACTUATION INHALER] INHALE TWO PUFFS BY MOUTH EVERY 4 HOURS AS NEEDED  -     fluticasone (FLOVENT HFA) 220 MCG/ACT inhaler; Inhale 1 puff into the lungs 2 times daily    Need for prophylactic antibiotic  -     amoxicillin (AMOXIL) 500 MG capsule; Take 4 capsules by mouth 1 hour before dental appointment    Obstructive sleep apnea       - No identified additional risk factors other than previously addressed    Antiplatelet or Anticoagulation Medication Instructions:   - Patient is on no antiplatelet or anticoagulation medications.    Additional Medication Instructions:  Patient is to take all scheduled medications on the day of surgery    RECOMMENDATION:  APPROVAL GIVEN to proceed  with proposed procedure, without further diagnostic evaluation.      Subjective     HPI related to upcoming procedure:     He has symptomatic right hip osteoarthritis and is exhausted more conservative means for treatment and is now scheduled for knee arthroplasty.    His medical history includes mild intermittent asthma, the asthma is under good control.  Typical triggers have been allergens.  He has obstructive sleep apnea and uses a CPAP.  He has no history of heart disease.  He has good activity tolerance without any acute illness.    In the past he has suffered from rather severe nausea associated with anesthesia but more recently with premedication he has had no significant issues with nausea.        4/30/2023     1:11 PM   Preop Questions   1. Have you ever had a heart attack or stroke? No   2. Have you ever had surgery on your heart or blood vessels, such as a stent placement, a coronary artery bypass, or surgery on an artery in your head, neck, heart, or legs? No   3. Do you have chest pain with activity? No   4. Do you have a history of  heart failure? No   5. Do you currently have a cold, bronchitis or symptoms of other infection? No   6. Do you have a cough, shortness of breath, or wheezing? No   7. Do you or anyone in your family have previous history of blood clots? No   8. Do you or does anyone in your family have a serious bleeding problem such as prolonged bleeding following surgeries or cuts? No   9. Have you ever had problems with anemia or been told to take iron pills? No   10. Have you had any abnormal blood loss such as black, tarry or bloody stools? No   11. Have you ever had a blood transfusion? No   12. Are you willing to have a blood transfusion if it is medically needed before, during, or after your surgery? Yes   13. Have you or any of your relatives ever had problems with anesthesia? YES - causes nausea   14. Do you have sleep apnea, excessive snoring or daytime drowsiness? YES -uses  CPAP see above   14a. Do you have a CPAP machine? No   15. Do you have any artifical heart valves or other implanted medical devices like a pacemaker, defibrillator, or continuous glucose monitor? No   16. Do you have artificial joints? YES -right knee   17. Are you allergic to latex? No       Health Care Directive:  Patient does not have a Health Care Directive or Living Will:     Preoperative Review of :        Status of Chronic Conditions:  See problem list for active medical problems.  Problems all longstanding and stable, except as noted/documented.  See ROS for pertinent symptoms related to these conditions.      Review of Systems  Complete review of systems is obtained.  Other than the specific considerations noted above complete review of systems is negative.      Patient Active Problem List    Diagnosis Date Noted     Psychosexual dysfunction with inhibited sexual excitement 04/28/2023     Priority: Medium     Formatting of this note might be different from the original.  Created by Conversion       Morbid obesity (H) 04/06/2022     Priority: Medium     Asthma      Priority: Medium     Created by Conversion         Obstructive sleep apnea      Priority: Medium     Created by Conversion         Allergic Rhinitis      Priority: Medium     Created by Conversion  Replacement Utility updated for latest IMO load         Fever 02/05/2015     Priority: Medium     Nasal congestion 02/05/2015     Priority: Medium     Cough 02/05/2015     Priority: Medium     Chronic Allergic Conjunctivitis      Priority: Medium     Created by Conversion         Male Erectile Disorder      Priority: Medium     Created by Conversion         Esophageal Reflux      Priority: Medium     Created by Conversion         Skin Neoplasm Of Uncertain Behavior      Priority: Medium     Created by Conversion         Fatigue      Priority: Medium     Created by Conversion         Benign Adenomatous Polyp Of The Large Intestine      Priority:  "Medium     Created by Conversion          Past Medical History:   Diagnosis Date     Arthritis      PONV (postoperative nausea and vomiting)      Uncomplicated asthma      Past Surgical History:   Procedure Laterality Date     ORTHOPEDIC SURGERY       Current Outpatient Medications   Medication Sig Dispense Refill     acetaminophen (TYLENOL) 500 MG tablet Take 500-1,000 mg by mouth every 6 hours as needed for mild pain       albuterol (PROAIR HFA/PROVENTIL HFA/VENTOLIN HFA) 108 (90 Base) MCG/ACT inhaler [ALBUTEROL (VENTOLIN HFA) 90 MCG/ACTUATION INHALER] INHALE TWO PUFFS BY MOUTH EVERY 4 HOURS AS NEEDED 18 g 11     amoxicillin (AMOXIL) 500 MG capsule Take 4 capsules by mouth 1 hour before dental appointment 4 capsule 3     fluticasone (FLOVENT HFA) 220 MCG/ACT inhaler Inhale 1 puff into the lungs 2 times daily 12 g 11     fluticasone (FLOVENT HFA) 220 MCG/ACT inhaler Inhale 1 puff into the lungs 2 times daily 12 g 11     cyclobenzaprine (FLEXERIL) 5 MG tablet Take 1 tablet (5 mg) by mouth 3 times daily as needed for muscle spasms (Patient not taking: Reported on 5/2/2023) 20 tablet 1     fluticasone (FLONASE) 50 mcg/actuation nasal spray [FLUTICASONE (FLONASE) 50 MCG/ACTUATION NASAL SPRAY] 1 spray into each nostril daily. (Patient not taking: Reported on 5/2/2023)         Allergies   Allergen Reactions     Metronidazole Rash        Social History     Tobacco Use     Smoking status: Never     Smokeless tobacco: Never   Vaping Use     Vaping status: Never Used   Substance Use Topics     Alcohol use: Not Currently       History   Drug Use Unknown         Objective     /76   Pulse 65   Temp 98.1  F (36.7  C)   Resp 18   Ht 1.778 m (5' 10\")   Wt 87.8 kg (193 lb 9.6 oz)   SpO2 97%   BMI 27.78 kg/m      Physical Exam        General Appearance:    Alert, cooperative, no distress   Eyes:   No scleral icterus or conjunctival irritation       Ears:    Normal TM's and external ear canals, both ears   Throat:   " Lips, mucosa, and tongue normal; teeth and gums normal   Neck:   Supple, symmetrical, trachea midline, no adenopathy;        thyroid:  No enlargement/tenderness/nodules   Lungs:     Clear to auscultation bilaterally, respirations unlabored, no wheezes or crackles   Heart:    Regular rate and rhythm,  No murmur   Abdomen:    Soft, no distention, no tenderness on palpation, no masses, no organomegaly     Extremities:  No edema, no joint swelling or redness, no evidence of any injuries   Skin:  No concerning skin findings, no suspicious moles, no rashes   Neurologic:  On gross examination there is no motor or sensory deficit.  Patient walks with a normal gait           Recent Labs   Lab Test 04/06/22  1623 05/04/21  0935   HGB  --  14.3   PLT  --  230    140   POTASSIUM 4.8 4.0   CR 1.18 0.97        Diagnostics:     Recent Results (from the past 24 hour(s))   CBC with platelets    Collection Time: 05/02/23  4:13 PM   Result Value Ref Range    WBC Count 5.6 4.0 - 11.0 10e3/uL    RBC Count 4.43 4.40 - 5.90 10e6/uL    Hemoglobin 13.1 (L) 13.3 - 17.7 g/dL    Hematocrit 38.6 (L) 40.0 - 53.0 %    MCV 87 78 - 100 fL    MCH 29.6 26.5 - 33.0 pg    MCHC 33.9 31.5 - 36.5 g/dL    RDW 12.5 10.0 - 15.0 %    Platelet Count 229 150 - 450 10e3/uL       No EKG required, no history of coronary heart disease, significant arrhythmia, peripheral arterial disease or other structural heart disease.    Revised Cardiac Risk Index (RCRI):  The patient has the following serious cardiovascular risks for perioperative complications:   - No serious cardiac risks = 0 points     RCRI Interpretation: 0 points: Class I (very low risk - 0.4% complication rate)    Wt Readings from Last 3 Encounters:   05/02/23 87.8 kg (193 lb 9.6 oz)   04/06/22 107.7 kg (237 lb 8 oz)   05/04/21 117.5 kg (259 lb)        BP Readings from Last 6 Encounters:   05/02/23 124/76   04/06/22 116/68   05/04/21 126/76   07/28/20 (!) 140/90   11/18/19 128/76        Signed  Electronically by: Sean Dhaliwal MD, MD  Copy of this evaluation report is provided to requesting physician.

## 2023-05-08 NOTE — PROGRESS NOTES
Patient planning to discharge home on the day of surgery. Both his daughter and his ex-wife have volunteered to help him after surgery. So, he will have someone staying over night with him the first 2-3 nights after surgery.    Discharge plan according to [facility]:       03/06/23 1036   Discharge Planning   Patient/Family Anticipates Transition to home   Concerns to be Addressed all concerns addressed in this encounter   Living Arrangements   People in Home alone   Type of Residence Private Residence   Is your private residence a single family home or apartment? Single family home   Stair Railings, Within Home, Primary railings safe and in good condition   Bathroom Shower/Tub Walk-in shower   Equipment Currently Used at Home none   Support System   Support Systems Other (Comment)  (Ex wife)   Do you have someone available to stay with you one or two nights once you are home? Yes

## 2023-05-12 ENCOUNTER — ANESTHESIA EVENT (OUTPATIENT)
Dept: SURGERY | Facility: CLINIC | Age: 63
End: 2023-05-12
Payer: COMMERCIAL

## 2023-05-15 ENCOUNTER — APPOINTMENT (OUTPATIENT)
Dept: PHYSICAL THERAPY | Facility: CLINIC | Age: 63
End: 2023-05-15
Attending: ORTHOPAEDIC SURGERY
Payer: COMMERCIAL

## 2023-05-15 ENCOUNTER — APPOINTMENT (OUTPATIENT)
Dept: OCCUPATIONAL THERAPY | Facility: CLINIC | Age: 63
End: 2023-05-15
Payer: COMMERCIAL

## 2023-05-15 ENCOUNTER — HOSPITAL ENCOUNTER (OUTPATIENT)
Facility: CLINIC | Age: 63
Discharge: HOME | End: 2023-05-15
Attending: ORTHOPAEDIC SURGERY | Admitting: ORTHOPAEDIC SURGERY
Payer: COMMERCIAL

## 2023-05-15 ENCOUNTER — ANESTHESIA (OUTPATIENT)
Dept: SURGERY | Facility: CLINIC | Age: 63
End: 2023-05-15
Payer: COMMERCIAL

## 2023-05-15 ENCOUNTER — APPOINTMENT (OUTPATIENT)
Dept: RADIOLOGY | Facility: CLINIC | Age: 63
End: 2023-05-15
Payer: COMMERCIAL

## 2023-05-15 VITALS
TEMPERATURE: 97.4 F | HEIGHT: 70 IN | DIASTOLIC BLOOD PRESSURE: 71 MMHG | OXYGEN SATURATION: 97 % | BODY MASS INDEX: 27.63 KG/M2 | WEIGHT: 193 LBS | SYSTOLIC BLOOD PRESSURE: 129 MMHG | RESPIRATION RATE: 18 BRPM | HEART RATE: 64 BPM

## 2023-05-15 DIAGNOSIS — Z96.641 S/P TOTAL RIGHT HIP ARTHROPLASTY: Primary | ICD-10-CM

## 2023-05-15 LAB
CREAT SERPL-MCNC: 0.87 MG/DL (ref 0.7–1.3)
ERYTHROCYTE [DISTWIDTH] IN BLOOD BY AUTOMATED COUNT: 12 % (ref 10–15)
GFR SERPL CREATININE-BSD FRML MDRD: >90 ML/MIN/1.73M2
HCT VFR BLD AUTO: 42.3 % (ref 40–53)
HGB BLD-MCNC: 14 G/DL (ref 13.3–17.7)
INR PPP: 1.04 (ref 0.85–1.15)
MCH RBC QN AUTO: 29.1 PG (ref 26.5–33)
MCHC RBC AUTO-ENTMCNC: 33.1 G/DL (ref 31.5–36.5)
MCV RBC AUTO: 88 FL (ref 78–100)
PLATELET # BLD AUTO: 240 10E3/UL (ref 150–450)
POTASSIUM BLD-SCNC: 4.5 MMOL/L (ref 3.5–5)
RBC # BLD AUTO: 4.81 10E6/UL (ref 4.4–5.9)
WBC # BLD AUTO: 4.6 10E3/UL (ref 4–11)

## 2023-05-15 PROCEDURE — 82565 ASSAY OF CREATININE: CPT

## 2023-05-15 PROCEDURE — 85027 COMPLETE CBC AUTOMATED: CPT

## 2023-05-15 PROCEDURE — C1776 JOINT DEVICE (IMPLANTABLE): HCPCS | Performed by: ORTHOPAEDIC SURGERY

## 2023-05-15 PROCEDURE — 370N000017 HC ANESTHESIA TECHNICAL FEE, PER MIN: Performed by: ORTHOPAEDIC SURGERY

## 2023-05-15 PROCEDURE — 258N000001 HC RX 258

## 2023-05-15 PROCEDURE — 258N000003 HC RX IP 258 OP 636: Performed by: NURSE ANESTHETIST, CERTIFIED REGISTERED

## 2023-05-15 PROCEDURE — 97535 SELF CARE MNGMENT TRAINING: CPT | Mod: GO

## 2023-05-15 PROCEDURE — 84132 ASSAY OF SERUM POTASSIUM: CPT

## 2023-05-15 PROCEDURE — 85610 PROTHROMBIN TIME: CPT

## 2023-05-15 PROCEDURE — 250N000011 HC RX IP 250 OP 636

## 2023-05-15 PROCEDURE — 97166 OT EVAL MOD COMPLEX 45 MIN: CPT | Mod: GO

## 2023-05-15 PROCEDURE — 999N000065 XR PELVIS PORT 1/2 VIEWS

## 2023-05-15 PROCEDURE — 97161 PT EVAL LOW COMPLEX 20 MIN: CPT | Mod: GP

## 2023-05-15 PROCEDURE — 36415 COLL VENOUS BLD VENIPUNCTURE: CPT

## 2023-05-15 PROCEDURE — 999N000141 HC STATISTIC PRE-PROCEDURE NURSING ASSESSMENT: Performed by: ORTHOPAEDIC SURGERY

## 2023-05-15 PROCEDURE — 250N000013 HC RX MED GY IP 250 OP 250 PS 637: Performed by: ANESTHESIOLOGY

## 2023-05-15 PROCEDURE — 250N000009 HC RX 250

## 2023-05-15 PROCEDURE — 710N000010 HC RECOVERY PHASE 1, LEVEL 2, PER MIN: Performed by: ORTHOPAEDIC SURGERY

## 2023-05-15 PROCEDURE — 360N000077 HC SURGERY LEVEL 4, PER MIN: Performed by: ORTHOPAEDIC SURGERY

## 2023-05-15 PROCEDURE — 250N000011 HC RX IP 250 OP 636: Performed by: ANESTHESIOLOGY

## 2023-05-15 PROCEDURE — 258N000003 HC RX IP 258 OP 636: Performed by: ANESTHESIOLOGY

## 2023-05-15 PROCEDURE — C1713 ANCHOR/SCREW BN/BN,TIS/BN: HCPCS | Performed by: ORTHOPAEDIC SURGERY

## 2023-05-15 PROCEDURE — 710N000012 HC RECOVERY PHASE 2, PER MINUTE: Performed by: ORTHOPAEDIC SURGERY

## 2023-05-15 PROCEDURE — 250N000009 HC RX 250: Performed by: ORTHOPAEDIC SURGERY

## 2023-05-15 PROCEDURE — 272N000001 HC OR GENERAL SUPPLY STERILE: Performed by: ORTHOPAEDIC SURGERY

## 2023-05-15 PROCEDURE — 97110 THERAPEUTIC EXERCISES: CPT | Mod: GP

## 2023-05-15 PROCEDURE — 250N000009 HC RX 250: Performed by: NURSE ANESTHETIST, CERTIFIED REGISTERED

## 2023-05-15 PROCEDURE — 97116 GAIT TRAINING THERAPY: CPT | Mod: GP

## 2023-05-15 PROCEDURE — 250N000011 HC RX IP 250 OP 636: Performed by: NURSE ANESTHETIST, CERTIFIED REGISTERED

## 2023-05-15 PROCEDURE — 250N000013 HC RX MED GY IP 250 OP 250 PS 637

## 2023-05-15 DEVICE — PINNACLE GRIPTION ACETABULAR SHELL SECTOR 54MM OD
Type: IMPLANTABLE DEVICE | Site: HIP | Status: FUNCTIONAL
Brand: PINNACLE GRIPTION

## 2023-05-15 DEVICE — BIOLOX DELTA CERAMIC FEMORAL HEAD +1.5 36MM DIA 12/14 TAPER
Type: IMPLANTABLE DEVICE | Site: HIP | Status: FUNCTIONAL
Brand: BIOLOX DELTA

## 2023-05-15 DEVICE — PINNACLE HIP SOLUTIONS ALTRX POLYETHYLENE ACETABULAR LINER +4 NEUTRAL 36MM ID 54MM OD
Type: IMPLANTABLE DEVICE | Site: HIP | Status: FUNCTIONAL
Brand: PINNACLE ALTRX

## 2023-05-15 DEVICE — ACTIS DUOFIX HIP PROSTHESIS (FEMORAL STEM 12/14 TAPER CEMENTLESS SIZE 6 HIGH COLLAR)  CE
Type: IMPLANTABLE DEVICE | Site: HIP | Status: FUNCTIONAL
Brand: ACTIS

## 2023-05-15 DEVICE — APEX HOLE ELIMINATOR - PS
Type: IMPLANTABLE DEVICE | Site: HIP | Status: FUNCTIONAL
Brand: APEX

## 2023-05-15 DEVICE — PINNACLE CANCELLOUS BONE SCREW 6.5MM X 25MM
Type: IMPLANTABLE DEVICE | Site: HIP | Status: FUNCTIONAL
Brand: PINNACLE

## 2023-05-15 RX ORDER — TRANEXAMIC ACID 650 MG/1
1950 TABLET ORAL ONCE
Status: COMPLETED | OUTPATIENT
Start: 2023-05-15 | End: 2023-05-15

## 2023-05-15 RX ORDER — ACETAMINOPHEN 325 MG/1
975 TABLET ORAL ONCE
Status: DISCONTINUED | OUTPATIENT
Start: 2023-05-15 | End: 2023-05-15 | Stop reason: HOSPADM

## 2023-05-15 RX ORDER — ASPIRIN 81 MG/1
81 TABLET ORAL 2 TIMES DAILY
Qty: 60 TABLET | Refills: 0 | Status: SHIPPED | OUTPATIENT
Start: 2023-05-15

## 2023-05-15 RX ORDER — OXYCODONE HYDROCHLORIDE 5 MG/1
5 TABLET ORAL EVERY 4 HOURS PRN
Status: CANCELLED | OUTPATIENT
Start: 2023-05-15

## 2023-05-15 RX ORDER — AMOXICILLIN 250 MG
1 CAPSULE ORAL 2 TIMES DAILY
Status: CANCELLED | OUTPATIENT
Start: 2023-05-15

## 2023-05-15 RX ORDER — CELECOXIB 200 MG/1
200 CAPSULE ORAL ONCE
Status: COMPLETED | OUTPATIENT
Start: 2023-05-15 | End: 2023-05-15

## 2023-05-15 RX ORDER — GLYCINE 1.5 G/100ML
SOLUTION IRRIGATION PRN
Status: DISCONTINUED | OUTPATIENT
Start: 2023-05-15 | End: 2023-05-15 | Stop reason: HOSPADM

## 2023-05-15 RX ORDER — AMOXICILLIN 250 MG
1-2 CAPSULE ORAL 2 TIMES DAILY
Qty: 15 TABLET | Refills: 0 | Status: SHIPPED | OUTPATIENT
Start: 2023-05-15

## 2023-05-15 RX ORDER — LIDOCAINE 40 MG/G
CREAM TOPICAL
Status: CANCELLED | OUTPATIENT
Start: 2023-05-15

## 2023-05-15 RX ORDER — OXYCODONE HYDROCHLORIDE 5 MG/1
5 TABLET ORAL EVERY 6 HOURS PRN
Qty: 25 TABLET | Refills: 0 | Status: SHIPPED | OUTPATIENT
Start: 2023-05-15

## 2023-05-15 RX ORDER — HYDROMORPHONE HCL IN WATER/PF 6 MG/30 ML
0.2 PATIENT CONTROLLED ANALGESIA SYRINGE INTRAVENOUS
Status: CANCELLED | OUTPATIENT
Start: 2023-05-15

## 2023-05-15 RX ORDER — FENTANYL CITRATE 50 UG/ML
50 INJECTION, SOLUTION INTRAMUSCULAR; INTRAVENOUS
Status: DISCONTINUED | OUTPATIENT
Start: 2023-05-15 | End: 2023-05-15 | Stop reason: HOSPADM

## 2023-05-15 RX ORDER — ONDANSETRON 2 MG/ML
4 INJECTION INTRAMUSCULAR; INTRAVENOUS EVERY 30 MIN PRN
Status: DISCONTINUED | OUTPATIENT
Start: 2023-05-15 | End: 2023-05-15 | Stop reason: HOSPADM

## 2023-05-15 RX ORDER — MAGNESIUM HYDROXIDE/ALUMINUM HYDROXICE/SIMETHICONE 120; 1200; 1200 MG/30ML; MG/30ML; MG/30ML
30 SUSPENSION ORAL EVERY 4 HOURS PRN
Status: CANCELLED | OUTPATIENT
Start: 2023-05-15

## 2023-05-15 RX ORDER — ONDANSETRON 2 MG/ML
4 INJECTION INTRAMUSCULAR; INTRAVENOUS EVERY 6 HOURS PRN
Status: DISCONTINUED | OUTPATIENT
Start: 2023-05-15 | End: 2023-05-15 | Stop reason: HOSPADM

## 2023-05-15 RX ORDER — SODIUM CHLORIDE, SODIUM LACTATE, POTASSIUM CHLORIDE, CALCIUM CHLORIDE 600; 310; 30; 20 MG/100ML; MG/100ML; MG/100ML; MG/100ML
INJECTION, SOLUTION INTRAVENOUS CONTINUOUS
Status: DISCONTINUED | OUTPATIENT
Start: 2023-05-15 | End: 2023-05-15 | Stop reason: HOSPADM

## 2023-05-15 RX ORDER — ONDANSETRON 2 MG/ML
INJECTION INTRAMUSCULAR; INTRAVENOUS PRN
Status: DISCONTINUED | OUTPATIENT
Start: 2023-05-15 | End: 2023-05-15

## 2023-05-15 RX ORDER — EPHEDRINE SULFATE 50 MG/ML
INJECTION, SOLUTION INTRAMUSCULAR; INTRAVENOUS; SUBCUTANEOUS PRN
Status: DISCONTINUED | OUTPATIENT
Start: 2023-05-15 | End: 2023-05-15

## 2023-05-15 RX ORDER — DEXAMETHASONE SODIUM PHOSPHATE 10 MG/ML
INJECTION, SOLUTION INTRAMUSCULAR; INTRAVENOUS PRN
Status: DISCONTINUED | OUTPATIENT
Start: 2023-05-15 | End: 2023-05-15

## 2023-05-15 RX ORDER — PROCHLORPERAZINE MALEATE 5 MG
5 TABLET ORAL EVERY 6 HOURS PRN
Status: CANCELLED | OUTPATIENT
Start: 2023-05-15

## 2023-05-15 RX ORDER — CEFAZOLIN SODIUM/WATER 2 G/20 ML
2 SYRINGE (ML) INTRAVENOUS
Status: COMPLETED | OUTPATIENT
Start: 2023-05-15 | End: 2023-05-15

## 2023-05-15 RX ORDER — MAGNESIUM SULFATE 4 G/50ML
4 INJECTION INTRAVENOUS ONCE
Status: COMPLETED | OUTPATIENT
Start: 2023-05-15 | End: 2023-05-15

## 2023-05-15 RX ORDER — FENTANYL CITRATE 50 UG/ML
25 INJECTION, SOLUTION INTRAMUSCULAR; INTRAVENOUS
Status: DISCONTINUED | OUTPATIENT
Start: 2023-05-15 | End: 2023-05-15 | Stop reason: HOSPADM

## 2023-05-15 RX ORDER — HYDROMORPHONE HCL IN WATER/PF 6 MG/30 ML
0.2 PATIENT CONTROLLED ANALGESIA SYRINGE INTRAVENOUS EVERY 5 MIN PRN
Status: DISCONTINUED | OUTPATIENT
Start: 2023-05-15 | End: 2023-05-15 | Stop reason: HOSPADM

## 2023-05-15 RX ORDER — HYDROMORPHONE HCL IN WATER/PF 6 MG/30 ML
0.4 PATIENT CONTROLLED ANALGESIA SYRINGE INTRAVENOUS
Status: CANCELLED | OUTPATIENT
Start: 2023-05-15

## 2023-05-15 RX ORDER — CEFAZOLIN SODIUM/WATER 2 G/20 ML
2 SYRINGE (ML) INTRAVENOUS SEE ADMIN INSTRUCTIONS
Status: DISCONTINUED | OUTPATIENT
Start: 2023-05-15 | End: 2023-05-15 | Stop reason: HOSPADM

## 2023-05-15 RX ORDER — HYDROXYZINE HYDROCHLORIDE 10 MG/1
10 TABLET, FILM COATED ORAL EVERY 6 HOURS PRN
Qty: 30 TABLET | Refills: 0 | Status: SHIPPED | OUTPATIENT
Start: 2023-05-15

## 2023-05-15 RX ORDER — FENTANYL CITRATE 50 UG/ML
50 INJECTION, SOLUTION INTRAMUSCULAR; INTRAVENOUS EVERY 5 MIN PRN
Status: DISCONTINUED | OUTPATIENT
Start: 2023-05-15 | End: 2023-05-15 | Stop reason: HOSPADM

## 2023-05-15 RX ORDER — ONDANSETRON 4 MG/1
4-8 TABLET, ORALLY DISINTEGRATING ORAL EVERY 8 HOURS PRN
Qty: 5 TABLET | Refills: 0 | Status: SHIPPED | OUTPATIENT
Start: 2023-05-15

## 2023-05-15 RX ORDER — GLYCOPYRROLATE 0.2 MG/ML
INJECTION, SOLUTION INTRAMUSCULAR; INTRAVENOUS PRN
Status: DISCONTINUED | OUTPATIENT
Start: 2023-05-15 | End: 2023-05-15

## 2023-05-15 RX ORDER — CEFAZOLIN SODIUM 2 G/100ML
2 INJECTION, SOLUTION INTRAVENOUS EVERY 8 HOURS
Status: DISCONTINUED | OUTPATIENT
Start: 2023-05-15 | End: 2023-05-15 | Stop reason: HOSPADM

## 2023-05-15 RX ORDER — ACETAMINOPHEN 325 MG/1
975 TABLET ORAL EVERY 8 HOURS
Status: DISCONTINUED | OUTPATIENT
Start: 2023-05-15 | End: 2023-05-15 | Stop reason: HOSPADM

## 2023-05-15 RX ORDER — POLYETHYLENE GLYCOL 3350 17 G/17G
17 POWDER, FOR SOLUTION ORAL DAILY
Status: CANCELLED | OUTPATIENT
Start: 2023-05-16

## 2023-05-15 RX ORDER — ASPIRIN 81 MG/1
81 TABLET ORAL 2 TIMES DAILY
Status: CANCELLED | OUTPATIENT
Start: 2023-05-15 | End: 2023-06-14

## 2023-05-15 RX ORDER — KETOROLAC TROMETHAMINE 30 MG/ML
15 INJECTION, SOLUTION INTRAMUSCULAR; INTRAVENOUS EVERY 6 HOURS
Status: CANCELLED | OUTPATIENT
Start: 2023-05-15 | End: 2023-05-16

## 2023-05-15 RX ORDER — HYDROMORPHONE HCL IN WATER/PF 6 MG/30 ML
0.4 PATIENT CONTROLLED ANALGESIA SYRINGE INTRAVENOUS EVERY 5 MIN PRN
Status: DISCONTINUED | OUTPATIENT
Start: 2023-05-15 | End: 2023-05-15 | Stop reason: HOSPADM

## 2023-05-15 RX ORDER — CELECOXIB 200 MG/1
200 CAPSULE ORAL DAILY
Qty: 30 CAPSULE | Refills: 0 | Status: SHIPPED | OUTPATIENT
Start: 2023-05-15 | End: 2023-06-14

## 2023-05-15 RX ORDER — BISACODYL 10 MG
10 SUPPOSITORY, RECTAL RECTAL DAILY PRN
Status: CANCELLED | OUTPATIENT
Start: 2023-05-15

## 2023-05-15 RX ORDER — ACETAMINOPHEN 325 MG/1
975 TABLET ORAL ONCE
Status: COMPLETED | OUTPATIENT
Start: 2023-05-15 | End: 2023-05-15

## 2023-05-15 RX ORDER — ONDANSETRON 4 MG/1
4 TABLET, ORALLY DISINTEGRATING ORAL EVERY 6 HOURS PRN
Status: DISCONTINUED | OUTPATIENT
Start: 2023-05-15 | End: 2023-05-15 | Stop reason: HOSPADM

## 2023-05-15 RX ORDER — ACETAMINOPHEN 325 MG/1
650 TABLET ORAL EVERY 6 HOURS PRN
Qty: 60 TABLET | Refills: 0 | Status: SHIPPED | OUTPATIENT
Start: 2023-05-15

## 2023-05-15 RX ORDER — HYDROXYZINE HYDROCHLORIDE 10 MG/1
10 TABLET, FILM COATED ORAL EVERY 6 HOURS PRN
Status: CANCELLED | OUTPATIENT
Start: 2023-05-15

## 2023-05-15 RX ORDER — SODIUM CHLORIDE, SODIUM LACTATE, POTASSIUM CHLORIDE, CALCIUM CHLORIDE 600; 310; 30; 20 MG/100ML; MG/100ML; MG/100ML; MG/100ML
INJECTION, SOLUTION INTRAVENOUS CONTINUOUS
Status: CANCELLED | OUTPATIENT
Start: 2023-05-15

## 2023-05-15 RX ORDER — ONDANSETRON 4 MG/1
4 TABLET, ORALLY DISINTEGRATING ORAL EVERY 30 MIN PRN
Status: DISCONTINUED | OUTPATIENT
Start: 2023-05-15 | End: 2023-05-15 | Stop reason: HOSPADM

## 2023-05-15 RX ORDER — PROPOFOL 10 MG/ML
INJECTION, EMULSION INTRAVENOUS CONTINUOUS PRN
Status: DISCONTINUED | OUTPATIENT
Start: 2023-05-15 | End: 2023-05-15

## 2023-05-15 RX ORDER — MAGNESIUM HYDROXIDE 1200 MG/15ML
LIQUID ORAL PRN
Status: DISCONTINUED | OUTPATIENT
Start: 2023-05-15 | End: 2023-05-15 | Stop reason: HOSPADM

## 2023-05-15 RX ORDER — ACETAMINOPHEN 325 MG/1
650 TABLET ORAL EVERY 4 HOURS PRN
Status: CANCELLED | OUTPATIENT
Start: 2023-05-18

## 2023-05-15 RX ORDER — OXYCODONE HYDROCHLORIDE 5 MG/1
5 TABLET ORAL
Status: DISCONTINUED | OUTPATIENT
Start: 2023-05-15 | End: 2023-05-15 | Stop reason: HOSPADM

## 2023-05-15 RX ORDER — OXYCODONE HYDROCHLORIDE 5 MG/1
10 TABLET ORAL EVERY 4 HOURS PRN
Status: CANCELLED | OUTPATIENT
Start: 2023-05-15

## 2023-05-15 RX ORDER — FENTANYL CITRATE 50 UG/ML
25 INJECTION, SOLUTION INTRAMUSCULAR; INTRAVENOUS EVERY 5 MIN PRN
Status: DISCONTINUED | OUTPATIENT
Start: 2023-05-15 | End: 2023-05-15 | Stop reason: HOSPADM

## 2023-05-15 RX ORDER — LIDOCAINE 40 MG/G
CREAM TOPICAL
Status: DISCONTINUED | OUTPATIENT
Start: 2023-05-15 | End: 2023-05-15 | Stop reason: HOSPADM

## 2023-05-15 RX ORDER — OXYCODONE HYDROCHLORIDE 5 MG/1
10 TABLET ORAL
Status: COMPLETED | OUTPATIENT
Start: 2023-05-15 | End: 2023-05-15

## 2023-05-15 RX ORDER — KETOROLAC TROMETHAMINE 30 MG/ML
INJECTION, SOLUTION INTRAMUSCULAR; INTRAVENOUS PRN
Status: DISCONTINUED | OUTPATIENT
Start: 2023-05-15 | End: 2023-05-15

## 2023-05-15 RX ORDER — POLYETHYLENE GLYCOL 3350 17 G/17G
1 POWDER, FOR SOLUTION ORAL DAILY
Qty: 7 PACKET | Refills: 0 | Status: SHIPPED | OUTPATIENT
Start: 2023-05-15

## 2023-05-15 RX ADMIN — ONDANSETRON 4 MG: 2 INJECTION INTRAMUSCULAR; INTRAVENOUS at 07:26

## 2023-05-15 RX ADMIN — DEXAMETHASONE SODIUM PHOSPHATE 10 MG: 10 INJECTION, SOLUTION INTRAMUSCULAR; INTRAVENOUS at 07:22

## 2023-05-15 RX ADMIN — MIDAZOLAM 2 MG: 1 INJECTION INTRAMUSCULAR; INTRAVENOUS at 07:10

## 2023-05-15 RX ADMIN — CELECOXIB 200 MG: 200 CAPSULE ORAL at 06:03

## 2023-05-15 RX ADMIN — Medication 5 MG: at 08:40

## 2023-05-15 RX ADMIN — Medication 2 G: at 07:15

## 2023-05-15 RX ADMIN — Medication 5 MG: at 08:28

## 2023-05-15 RX ADMIN — PHENYLEPHRINE HYDROCHLORIDE 100 MCG: 10 INJECTION INTRAVENOUS at 07:21

## 2023-05-15 RX ADMIN — FENTANYL CITRATE 50 MCG: 50 INJECTION, SOLUTION INTRAMUSCULAR; INTRAVENOUS at 09:53

## 2023-05-15 RX ADMIN — PHENYLEPHRINE HYDROCHLORIDE 0.2 MCG/KG/MIN: 10 INJECTION INTRAVENOUS at 07:24

## 2023-05-15 RX ADMIN — OXYCODONE HYDROCHLORIDE 10 MG: 5 TABLET ORAL at 10:43

## 2023-05-15 RX ADMIN — MEPIVACAINE HYDROCHLORIDE 3 ML: 15 INJECTION, SOLUTION EPIDURAL; INFILTRATION at 07:14

## 2023-05-15 RX ADMIN — ACETAMINOPHEN 975 MG: 325 TABLET ORAL at 06:02

## 2023-05-15 RX ADMIN — PROPOFOL 75 MCG/KG/MIN: 10 INJECTION, EMULSION INTRAVENOUS at 07:17

## 2023-05-15 RX ADMIN — SODIUM CHLORIDE, POTASSIUM CHLORIDE, SODIUM LACTATE AND CALCIUM CHLORIDE: 600; 310; 30; 20 INJECTION, SOLUTION INTRAVENOUS at 08:40

## 2023-05-15 RX ADMIN — CEFAZOLIN SODIUM 2 G: 2 INJECTION, SOLUTION INTRAVENOUS at 13:44

## 2023-05-15 RX ADMIN — TRANEXAMIC ACID 1950 MG: 650 TABLET ORAL at 06:02

## 2023-05-15 RX ADMIN — GLYCOPYRROLATE 0.2 MG: 0.2 INJECTION INTRAMUSCULAR; INTRAVENOUS at 07:52

## 2023-05-15 RX ADMIN — MAGNESIUM SULFATE HEPTAHYDRATE 4 G: 4 INJECTION, SOLUTION INTRAVENOUS at 06:37

## 2023-05-15 RX ADMIN — DEXMEDETOMIDINE HYDROCHLORIDE 8 MCG: 100 INJECTION, SOLUTION INTRAVENOUS at 07:50

## 2023-05-15 RX ADMIN — KETOROLAC TROMETHAMINE 15 MG: 30 INJECTION, SOLUTION INTRAMUSCULAR at 09:12

## 2023-05-15 RX ADMIN — PHENYLEPHRINE HYDROCHLORIDE 100 MCG: 10 INJECTION INTRAVENOUS at 08:43

## 2023-05-15 RX ADMIN — ONDANSETRON 4 MG: 4 TABLET, ORALLY DISINTEGRATING ORAL at 14:30

## 2023-05-15 RX ADMIN — SODIUM CHLORIDE, POTASSIUM CHLORIDE, SODIUM LACTATE AND CALCIUM CHLORIDE: 600; 310; 30; 20 INJECTION, SOLUTION INTRAVENOUS at 06:37

## 2023-05-15 ASSESSMENT — ACTIVITIES OF DAILY LIVING (ADL)
IADL_COMMENTS: FAMILY WILL DO UNTIL PT IS RECOVERED
ADLS_ACUITY_SCORE: 18

## 2023-05-15 NOTE — ANESTHESIA PROCEDURE NOTES
"Intrathecal injection Procedure Note    Pre-Procedure   Staff -        Anesthesiologist:  Tamika Hernandez MD       Performed By: anesthesiologist       Location: OR       Procedure Start/Stop Times: 5/15/2023 7:14 AM and 5/15/2023 7:15 AM       Pre-Anesthestic Checklist: patient identified, IV checked, risks and benefits discussed, informed consent, monitors and equipment checked, pre-op evaluation, at physician/surgeon's request and post-op pain management  Timeout:       Correct Patient: Yes        Correct Procedure: Yes        Correct Site: Yes        Correct Position: Yes   Procedure Documentation  Procedure: intrathecal injection       Patient Position: sitting       Skin prep: Chloraprep       Insertion Site: L2-3. (midline approach).       Needle Gauge: 24.        Needle Length (Inches): 4        Spinal Needle Type: Pencan       Introducer used       # of attempts: 1 and  # of redirects:     Assessment/Narrative         Paresthesias: No.       CSF fluid: clear.    Medication(s) Administered   1.5% Mepivacaine PF (Intrathecal) - Intrathecal   3 mL - 5/15/2023 7:14:00 AM  Medication Administration Time: 5/15/2023 7:14 AM      FOR [facility] ([facility]) ONLY:   Pain Team Contact information: please page the Pain Team Via [employer]. Search \"Pain\". During daytime hours, please page the attending first. At night please page the resident first.      "

## 2023-05-15 NOTE — PHARMACY-ADMISSION MEDICATION HISTORY
Pharmacist Admission Medication History    Admission medication history is complete. The information provided in this note is only as accurate as the sources available at the time of the update.    Medication reconciliation/reorder completed by provider prior to medication history? No    Information Source(s): Patient and CareEverywhere/SureScripts via in-person    Pertinent Information:      Changes made to PTA medication list:    Added: None    Deleted: None    Changed: Flonase, Flovent    Medication Affordability:  Not including over the counter (OTC) medications, was there a time in the past 3 months when you did not take your medications as prescribed because of cost?: No    Allergies reviewed with patient and updates made in EHR: yes    Medication History Completed By: Dez Hoffman [facility] 5/15/2023 6:53 AM    Prior to Admission medications    Medication Sig Last Dose Taking? Auth Provider Long Term End Date   acetaminophen (TYLENOL) 500 MG tablet Take 500-1,000 mg by mouth every 6 hours as needed for mild pain Past Week Yes Reported, Patient     albuterol (PROAIR HFA/PROVENTIL HFA/VENTOLIN HFA) 108 (90 Base) MCG/ACT inhaler [ALBUTEROL (VENTOLIN HFA) 90 MCG/ACTUATION INHALER] INHALE TWO PUFFS BY MOUTH EVERY 4 HOURS AS NEEDED More than a month at not with Yes Sean Dhaliwal MD Yes    amoxicillin (AMOXIL) 500 MG capsule Take 4 capsules by mouth 1 hour before dental appointment  Yes Sean Dhaliwal MD     fluticasone (FLONASE) 50 mcg/actuation nasal spray Spray 1 spray in nostril daily as needed More than a month at not with Yes Provider, Historical Yes    fluticasone (FLOVENT HFA) 220 MCG/ACT inhaler Inhale 1 puff into the lungs 2 times daily  Patient taking differently: Inhale 1 puff into the lungs 2 times daily as needed More than a month at not with Yes Sean Dhaliwal MD Yes

## 2023-05-15 NOTE — INTERVAL H&P NOTE
"I have reviewed the surgical (or preoperative) H&P that is linked to this encounter, and examined the patient. There are no significant changes    Clinical Conditions Present on Arrival:  Clinically Significant Risk Factors Present on Admission                  # Overweight: Estimated body mass index is 27.69 kg/m  as calculated from the following:    Height as of this encounter: 1.778 m (5' 10\").    Weight as of this encounter: 87.5 kg (193 lb).       "

## 2023-05-15 NOTE — OR NURSING
Hemovac drain removed and site covered with pressure dressing of ABD and ACE wrap over site.  Patient instructed to leave in place for 48 hours.

## 2023-05-15 NOTE — OP NOTE
xOperative Note    Name:  Jose Harvey  PCP:  Sean Dhaliwal  Procedure Date:  5/15/2023      Procedure(s):  Right total hip arthroplasty, posterior approach, J&J Actis/[place]  Removal of retained hardware, deep dissection    Pre-Procedure Diagnosis:  Degenerative joint disease of right hip [M16.11]  Previous right hip slipped capital femoral epiphysis-    Post-Procedure Diagnosis:    same    Surgeon(s):  Konstantin Jordan MD    Assist:  PACO Moe  Opiate assist was required for this operation due to the complexity of the procedure, for proper positioning of the limb during the operation, and for patient safety.    Anesthesia Type:  Regional    Antibiotics:  Ancef 2 g IVPB on induction  Ancef 3 g and 3 L for irrigation    Condition on discharge from OR:  stable    Indications:  The patient is a 63-year-old with end-stage arthritis of the right hip and failed the necessary treatments required, meeting the guidelines for medical necessity for treatment with total hip replacement.  We discussed the surgical options as well as the nonoperative care in detail with the use of their history, physical examination, imaging studies, and joint model of a normal hip and a total hip replacement.  They have been provided with appropriate documentation and the hospital-based education guidebook.  After the patient and/or family member read the guidebook I answered their questions.  I instructed them to make an appointment to attend the free preoperative class on joint replacement at [facility].  They received a package containing antiseptic agents including chlorhexidine soap and washcloths for body bathing, and underwent nasal povidone iodine swabbing preoperatively.  The patient acknowledged they understood the risks, benefit and potential benefits, alternatives, shortcomings, limitations and complications of operative and nonoperative treatment and informed consent for the following surgical procedure was obtained.   Retained hardware noted to be removed.  Leg length discrepancy right shorter than left    Findings:  Grade 4 change.    Operative Report:    Upon informed consent after review of the procedure along with attendant risk of complication an  informed cooperative decision  was made to proceed.  The leg was marked, labs were checked, my notes were reviewed, consent obtained, the chart reviewed, and the patient was properly premedicated.  The patient had been cleared by primary care and anesthesia.        All standard protocols and regimens as established by  the [facility] [place] were followed.  In preparation, x-rays were templated and plan formulated.  This was communicated with the intraoperative team.      Patient was brought to the operating room and placed on the table in a supine position. Pt underwent  anesthetic induction and appropriate medication was provided including antibiotic.  After anesthetic induction, the patient was placed on the left lateral decubitus position using the Stulberg hip martini.  Bony promises were properly padded and an axillary roll was placed.  The lower extremity was suspended and provisionally draped.  The right hemipelvis and thigh to the mid leg was then Betadine scrub x8 minutes, alcohol washed and ChloraPrep and in its entirety.  Stockinette was applied.  Ioban drapes placed on all exposed skin.      We paused for patient identification,  limb, and procedure confirmation.     Leg superimposed for leg length comparison.  Leg length discrepancy noted.  A straight lateral incision was made centered over the greater trochanter and dissection was carried down to the underlying fascia which was incised.  Self-retaining tractor was placed.  The lower extremity was internally rotated and a posterior approach was performed.  The piriformis tendon was left attached.  The capsule was then incised from the level of the piriformis tendon to the quadratus femoris muscle.   The capsule was tagged.  At this point the hip was easily dislocated.  Grade 4 change was noted.    3 retained threaded pins were within the femoral head and neck.  The femoral head was quartered and bone fragments were removed.  The pins were exposed completely.  The doc attachment was applied and tightened and the pins were removed without difficulty.  The femoral head and neck were then osteotomized in appropriate version.  Retractors were placed about the acetabulum both anteriorly and inferiorly.  The capsule was maintained.  A large osteophyte was removed anteriorly.    Reaming commenced at 49 mm and progressed in 1 mm increments to 54 mm line to line.  Anteversion and inclination were noted.  54 mm GRIPTION [place] shell was then impacted in proper inclination and anteversion seating optimally and fully.  A good interference fit was obtained.  2 acetabular screws were placed with good purchase.  A hole eliminator was placed and the +4 mm neutral liner applied.  The acetabular portal was flush with the native acetabulum.    Attention was then directed the proximal femur.  The  was used to approach the canal and the starter reamer was inserted.  I then broached with the Actis broaches starting at #0 progressing to #6 with complete fit and fill.  With the #6 Actis stem placed, high offset neck and a 1.5 mm x 36 mm head applied.  The hip was reduced.    The hip was stable to both flexion 90 degrees, internal rotation 90 degrees and adduction 15 degrees as well as external rotation and extension.  Push pull was less than 2 mm.  Leg lengths were approximate.  Trial components were retrieved.    The #6 high offset Actis stem was then impacted in place seating optimally and fully with the collar on the calcar previously planed.  The trunnion was cleaned and dried the +1.5 mm x 36 mm ceramic head affixed.  The hip was reduced and found to be stable as above.    3.5% aqueous Betadine solution soak.   Thorough lavage.  Irrisept throughout the wound.    The capsule was then repaired with nonabsorbable suture posteriorly creating a barrier to the posterior escape.  Drain was placed extracapsular.  The overlying fascia was repaired with combination of interrupted and running suture.  Subcutaneous tissue and skin closed in layers.  An impervious dressing was applied.    Counts were correct. Bleeding was largely from reamed cancellus surfaces of the acetabulum as well as from the medullary canal of the femur. The patient tolerated the procedure well and was sent to the OR in stable condition.      Estimated Blood Loss:   300 cc    Specimens:    Bone per routine YAO  3 threaded Smith pins       Drains: X1  Closed/Suction Drain 1 Right Hip Accordion 10 Burmese (Active)        Complications:    None    Konstantin Jordan MD     Date: 5/15/2023  Time: 9:08 AM      Implant Name Type Inv. Item Serial No.  Lot No. LRB No. Used Action   IMP APEX HOLE ELIMINATOR HIP DEPUY DURALOC 1246- - [id number] Metallic Hardware/[place] IMP APEX HOLE ELIMINATOR HIP DEPUY DURALOC 1246-  [employer]- [id number] Right 1 Implanted   IMP SHELL ACET DEPUY PINNACLE GRIPTION 54MM [id number] - [id number] Total Joint Component/Insert IMP SHELL ACET DEPUY PINNACLE GRIPTION 54MM [id number]  [employer]- [id number] Right 1 Implanted   IMP LINER HIP DEPUY PINNACLE ALTRX [id number] +4 [id number] - [id number] Total Joint Component/Insert IMP LINER HIP DEPUY PINNACLE ALTRX [id number] +4 1221-  [employer]- [id number] Right 1 Implanted   IMP SCR BONE CAN ACE 6.5X25MM 1217- - [id number] Metallic Hardware/[place] IMP SCR BONE CAN ACE 6.5X25MM 1217-  [employer]- [id number] Right 1 Implanted   IMP SCR BONE CAN ACE 6.5X25MM 1217- - [id number] Metallic Hardware/[place] IMP SCR BONE CAN ACE 6.5X25MM 1217-  [employer]- [id number] Right 1 Implanted   IMP STEM FEM DEPUY ACTIS COLLAR HI-OFFSET SZ  6MM 1010- - [id number] Total Joint Component/Insert IMP STEM FEM DEPUY ACTIS COLLAR HI-OFFSET SZ 6MM 1010-  J[employer] [facility] [employer]- [id number] Right 1 Implanted   IMP HEAD FEMORAL DEPUY CERAMIC 36MM +1.5MM 6582- - [id number] Total Joint Component/Insert IMP HEAD FEMORAL DEPUY CERAMIC 36MM +1.5MM 9924-  [employer] [facility] [employer]- [id number] Right 1 Implanted

## 2023-05-15 NOTE — PROGRESS NOTES
05/15/23 1310   Appointment Info   Signing Clinician's Name / Credentials (PT) Luis Russo   Quick Adds   Quick Adds Certification   Living Environment   People in Home alone   Current Living Arrangements condominium   Home Accessibility stairs within home;stairs to enter home   Number of Stairs, Main Entrance 2   Stair Railings, Main Entrance railings safe and in good condition   Number of Stairs, Within Home, Primary greater than 10 stairs   Stair Railings, Within Home, Primary railings safe and in good condition   Transportation Anticipated family or friend will provide   Living Environment Comments can stay primarily on one level if needed   Self-Care   Usual Activity Tolerance good   Current Activity Tolerance good   Fall history within last six months no   General Information   Onset of Illness/Injury or Date of Surgery 05/15/23   Pertinent History of Current Problem (include personal factors and/or comorbidities that impact the POC) R YAO   Existing Precautions/Restrictions no hip IR;no hip ER;no active hip ABD;no hip ADD past midline;no hip hyperextension;90 degree hip flexion;no pivoting or twisting   Weight-Bearing Status - RLE weight-bearing as tolerated   Cognition   Affect/Mental Status (Cognition) WFL   Follows Commands (Cognition) WFL   Posture    Posture Not impaired   Range of Motion (ROM)   Range of Motion ROM deficits secondary to surgical procedure   Strength (Manual Muscle Testing)   Strength (Manual Muscle Testing) No deficits observed during functional mobility   Transfers   Transfers sit-stand transfer   Sit-Stand Transfer   Sit-Stand Virgie (Transfers) supervision;verbal cues   Assistive Device (Sit-Stand Transfers) walker, front-wheeled   Gait/Stairs (Locomotion)   Virgie Level (Gait) contact guard;verbal cues   Assistive Device (Gait) walker, front-wheeled   Distance in Feet 20   Distance in Feet (Gait) 220   Pattern (Gait) swing-through   Deviations/Abnormal Patterns  (Gait) gait speed decreased   Balance   Balance no deficits were identified   Sensory Examination   Sensory Perception patient reports no sensory changes   Clinical Impression   Criteria for Skilled Therapeutic Intervention Yes, treatment indicated   PT Diagnosis (PT) impaired functional moblity   Influenced by the following impairments weakness, pain   Functional limitations due to impairments transfers, gait   Clinical Presentation (PT Evaluation Complexity) Stable/Uncomplicated   Clinical Presentation Rationale Pt. presents as medically diagnosed   Clinical Decision Making (Complexity) low complexity   Planned Therapy Interventions (PT) gait training;home exercise program;patient/family education;transfer training;stair training   Anticipated Equipment Needs at Discharge (PT) walker, rolling   Risk & Benefits of therapy have been explained evaluation/treatment results reviewed;participants included;patient   PT Total Evaluation Time   PT Eval, Low Complexity Minutes (80384) 10   Plan of Care Review   Plan of Care Reviewed With patient   Therapy Certification   Start of care date 05/15/23   Certification date from 05/15/23   Certification date to 06/15/23   Physical Therapy Goals   PT Frequency One time eval and treatment only   PT Predicted Duration/Target Date for Goal Attainment 05/15/23   PT Goals Transfers;Gait;Stairs;PT Goal 1   PT: Transfers Modified independent;Sit to/from stand;Within precautions;Goal Met   PT: Gait Modified independent;Rolling walker;Within precautions;150 feet;Goal Met   PT: Stairs Supervision/stand-by assist;8 stairs;Rail on right;Within precautions;Goal Met   PT: Goal 1 Indep with HEP , goal met   Interventions   Interventions Quick Adds Gait Training;Therapeutic Procedure   Therapeutic Procedure/Exercise   Ther. Procedure: strength, endurance, ROM, flexibillity Minutes (29791) 15   Symptoms Noted During/After Treatment none   Treatment Detail/Skilled Intervention YAO HEP x 10  , vc  for technique and to remind of hip precautions   Gait Training   Gait Training Minutes (40750) 12   Symptoms Noted During/After Treatment (Gait Training) none   Treatment Detail/Skilled Intervention slow stable gait, vc for heel strike, reciprocal gait, vc on stairs for nonreciprocal gait,k up/down 10 stairs total,  initial 4 stairs up with both rails and then switched to single rail   Kenneth Level (Gait Training) independent   Physical Assistance Level (Gait Training) verbal cues   Weight Bearing (Gait Training) weight-bearing as tolerated   Assistive Device (Gait Training) rolling walker   Pattern Analysis (Gait Training) swing-through gait   Gait Analysis Deviations decreased elena;decreased step length   Stair Railings present at both sides;present on right side   Physical Assist/Nonphysical Assist (Stairs) supervision;verbal cues   Level of Kenneth (Stairs) stand-by assist   PT Discharge Planning   PT Plan dc PT   PT Discharge Recommendation (DC Rec)   (per ortho MD)   PT Rationale for DC Rec Patient moving well, has good home setup and support   PT Brief overview of current status Patient has met PT goals and is functionallly independent with mobitiy   Total Session Time   Timed Code Treatment Minutes 27   Total Session Time (sum of timed and untimed services) 37   [facility]  OUTPATIENT PHYSICAL THERAPY EVALUATION  PLAN OF TREATMENT FOR OUTPATIENT REHABILITATION  (COMPLETE FOR INITIAL CLAIMS ONLY)  Patient's Last Name, First Name, M.I.  YOB: 1960  Jose Harvey                        Provider's Name  [facility] Medical Record No.  [id number]                             Onset Date:  05/15/23   Start of Care Date:  05/15/23   Type:     _X_PT   ___OT   ___SLP Medical Diagnosis:                 PT Diagnosis:  impaired functional moblity Visits from SOC:  1     See note for plan of treatment, functional goals and  certification details    I CERTIFY THE NEED FOR THESE SERVICES FURNISHED UNDER        THIS PLAN OF TREATMENT AND WHILE UNDER MY CARE     (Physician co-signature of this document indicates review and certification of the therapy plan).

## 2023-05-15 NOTE — ANESTHESIA POSTPROCEDURE EVALUATION
Patient: Jose Harvey    Procedure: Procedure(s):  RIGHT TOTAL HIP ARTHROPLASTY, POSTERIOR, AND HARDWARE REMOVAL       Anesthesia Type:  Spinal    Note:     Postop Pain Control: Uneventful            Sign Out: Well controlled pain   PONV: No   Neuro/Psych: Uneventful            Sign Out: Acceptable/Baseline neuro status   Airway/Respiratory: Uneventful            Sign Out: Acceptable/Baseline resp. status   CV/Hemodynamics: Uneventful            Sign Out: Acceptable CV status; No obvious hypovolemia; No obvious fluid overload   Other NRE: NONE   DID A NON-ROUTINE EVENT OCCUR?            Last vitals:  Vitals Value Taken Time   /65 05/15/23 0950   Temp 36.3  C (97.4  F) 05/15/23 0933   Pulse 70 05/15/23 0955   Resp 15 05/15/23 0955   SpO2 96 % 05/15/23 0955   Vitals shown include unvalidated device data.    Electronically Signed By: Tamika Hernandez MD  May 15, 2023  9:57 AM

## 2023-05-15 NOTE — ANESTHESIA PREPROCEDURE EVALUATION
Anesthesia Pre-Procedure Evaluation    Patient: Jose Harvey   MRN: [id number] : 1960        Procedure : Procedure(s):  RIGHT TOTAL HIP ARTHROPLASTY POSTERIOR          Past Medical History:   Diagnosis Date     Arthritis      PONV (postoperative nausea and vomiting)      Sleep apnea      Uncomplicated asthma       Past Surgical History:   Procedure Laterality Date     TOTAL KNEE ARTHROPLASTY Left 2018      Allergies   Allergen Reactions     Metronidazole Rash      Social History     Tobacco Use     Smoking status: Never     Smokeless tobacco: Never   Vaping Use     Vaping status: Never Used   Substance Use Topics     Alcohol use: Yes     Comment: rare      Wt Readings from Last 1 Encounters:   05/15/23 87.5 kg (193 lb)        Anesthesia Evaluation   Pt has had prior anesthetic.     History of anesthetic complications  - PONV.      ROS/MED HX  ENT/Pulmonary:     (+) sleep apnea, asthma     Neurologic:  - neg neurologic ROS     Cardiovascular:  - neg cardiovascular ROS     METS/Exercise Tolerance:     Hematologic:  - neg hematologic  ROS     Musculoskeletal:   (+) arthritis,     GI/Hepatic:  - neg GI/hepatic ROS     Renal/Genitourinary:  - neg Renal ROS     Endo:  - neg endo ROS     Psychiatric/Substance Use:       Infectious Disease:       Malignancy:       Other:            Physical Exam    Airway  airway exam normal      Mallampati: II   TM distance: > 3 FB   Neck ROM: full   Mouth opening: > 3 cm    Respiratory Devices and Support         Dental       (+) Minor Abnormalities - some fillings, tiny chips      Cardiovascular   cardiovascular exam normal          Pulmonary   pulmonary exam normal                OUTSIDE LABS:  CBC:   Lab Results   Component Value Date    WBC 5.6 2023    WBC 5.4 2021    HGB 13.1 (L) 2023    HGB 14.3 2021    HCT 38.6 (L) 2023    HCT 41.4 2021     2023     2021     BMP:   Lab Results   Component Value Date      04/06/2022     05/04/2021    POTASSIUM 4.8 04/06/2022    POTASSIUM 4.0 05/04/2021    CHLORIDE 105 04/06/2022    CHLORIDE 105 05/04/2021    CO2 26 04/06/2022    CO2 23 05/04/2021    BUN 15 04/06/2022    BUN 16 05/04/2021    CR 1.18 04/06/2022    CR 0.97 05/04/2021    GLC 88 04/06/2022     05/04/2021     COAGS: No results found for: PTT, INR, FIBR  POC: No results found for: BGM, HCG, HCGS  HEPATIC:   Lab Results   Component Value Date    ALBUMIN 4.2 04/06/2022    PROTTOTAL 7.3 04/06/2022    ALT 31 04/06/2022    AST 34 04/06/2022    ALKPHOS 67 04/06/2022    BILITOTAL 0.6 04/06/2022     OTHER:   Lab Results   Component Value Date    REBECCA 10.5 04/06/2022       Anesthesia Plan    ASA Status:  2   NPO Status:  NPO Appropriate    Anesthesia Type: Spinal.              Consents    Anesthesia Plan(s) and associated risks, benefits, and realistic alternatives discussed. Questions answered and patient/representative(s) expressed understanding.    - Discussed:     - Discussed with:  Patient      - Extended Intubation/Ventilatory Support Discussed: No.      - Patient is DNR/DNI Status: No         Postoperative Care    Pain management: Multi-modal analgesia.   PONV prophylaxis: Ondansetron (or other 5HT-3), Dexamethasone or Solumedrol     Comments:    Other Comments: Spinal anesthesia  1 PIV  Propofol infusion            Tamika Hernandez MD

## 2023-05-15 NOTE — PROGRESS NOTES
05/15/23 1400   Appointment Info   Signing Clinician's Name / Credentials (OT) Selena Montalvo, OTR/L   Quick Adds   Quick Adds Certification   Living Environment   People in Home alone  (He will be ataying at his daughters for a few days)   Current Living Arrangements condominium   Living Environment Comments has standard toilet and tub shower at home.   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   Additional Occupational Profile Info/Pertinent History of Current Problem Pt admitted for elective YAO, posterior approach today.   Existing Precautions/Restrictions no hip IR;no hip ER;no active hip ABD;no hip ADD past midline;no hip hyperextension;90 degree hip flexion;no pivoting or twisting   Right Lower Extremity (Weight-bearing Status) weight-bearing as tolerated (WBAT)   Cognitive Status Examination   Affect/Mental Status (Cognitive) WNL   Range of Motion Comprehensive   General Range of Motion no range of motion deficits identified   Strength Comprehensive (MMT)   General Manual Muscle Testing (MMT) Assessment no strength deficits identified   Bed Mobility   Comment (Bed Mobility) NT-pt was in a post op chair.   Transfers   Transfer Comments CGA, verbal cues for safe technique   Activities of Daily Living   BADL Assessment/Intervention lower body dressing   Lower Body Dressing Assessment/Training   [place] Level (Lower Body Dressing) maximum assist (25% patient effort)   Clinical Impression   Criteria for Skilled Therapeutic Interventions Met (OT) Yes, treatment indicated   OT Diagnosis decreased ind with adls   OT Problem List-Impairments impacting ADL  post-surgical precautions   Assessment of Occupational Performance 3-5 Performance Deficits   Identified Performance Deficits le dressing, toileting, bed mob, walker safety   Planned Therapy Interventions (OT) ADL retraining;bed mobility training;transfer training;home program guidelines   Clinical Decision Making Complexity (OT) moderate complexity   Anticipated Equipment Needs Upon Discharge (OT) reacher;raised toilet seat  (sock aid, long shoe horn)   Risk & Benefits of therapy have been explained evaluation/treatment results reviewed;care plan/treatment goals reviewed;risks/benefits reviewed;current/potential barriers reviewed;participants voiced agreement with care plan;participants included;patient   OT Total Evaluation Time   OT Eval, Moderate Complexity Minutes (70827) 15   Therapy Certification   Medical Diagnosis R YAO   Start of Care Date 05/15/23   Certification date from 05/15/23   Certification date to 05/29/23   OT Goals   Therapy Frequency (OT) One time eval and treatment   OT Goals Lower Body Dressing;Bed Mobility;Toilet Transfer/Toileting   OT: Lower Body Dressing Modified independent;within precautions;using adaptive equipment;Goal Met;Completed   OT: Bed Mobility Modified independent;supine to/from sitting;within precautions;Goal Met;Completed   OT: Toilet Transfer/Toileting Modified independent;toilet transfer;using adaptive equipment;within precautions;Goal Met;Completed   Interventions   Interventions Quick Adds Self-Care/Home Management   Self-Care/Home Management   Self-Care/Home Mgmt/ADL, Compensatory, Meal Prep Minutes (53346) 30   Symptoms Noted During/After Treatment (Meal Preparation/Planning Training) dizziness;other (see comments)  (nausea)   Treatment Detail/Skilled Intervention Pt had many questions regarding total hip activitiy precautions during ADLS.  Pt minimized the importance of folllowing the precautions initially, but after he was taught what they are and how they prevent  hip dislocation he was agreeable to folllowing them.Taught him hoe to dress lower body, including TEDS, while following precautions and usng reacher, sock aid and long shoe horn.  He learned the techniques quickly, but then felt nauseated and dizzy and the color drained from his face.  His RN responded quickly and gave him aid. He purchasd a reacher, long shoe horn and sock aid for home e.is knees while in bed to prevent rolling and dislocating hi s hip while asleep. He was able to teach back, and said he will sleep matthias recliner at first.  Discussed the importance of using a higher toilet.  He purchased a rts with bars immediately. Discussed and demonstrated how to get in and out of a car safely.  Again, he was able o teach back the safe techniques.Gave him a posterior yao precautions handout and answered his questions.   OT Discharge Planning   OT Plan dc OT   OT Discharge Recommendation (DC Rec)   (defer to ortho)   OT Rationale for DC Rec Pt moving well and has been taught how to perform ADLS safely while following YAO precautions   OT Brief overview of current status Mod I with Lower body dressing while following YAO precautions.   Total Session Time   Timed Code Treatment Minutes 30   Total Session Time (sum of timed and untimed services) 45      [facility]  OUTPATIENT OCCUPATIONAL THERAPY  EVALUATION  PLAN OF TREATMENT FOR OUTPATIENT REHABILITATION  (COMPLETE FOR INITIAL CLAIMS ONLY)  Patient's Last Name, First Name, M.I.  YOB: 1960  Jose Harvey                          Provider's Name  [facility] Medical Record No.  [id number]                             Onset Date:  05/15/23   Start of Care Date:  05/15/23   Type:     ___PT   _X_OT   ___SLP Medical Diagnosis:  R YAO                    OT Diagnosis:  decreased ind with adls Visits from SOC:  1     See note for plan of treatment, functional goals and certification details    I  CERTIFY THE NEED FOR THESE SERVICES FURNISHED UNDER        THIS PLAN OF TREATMENT AND WHILE UNDER MY CARE     (Physician co-signature of this document indicates review and certification of the therapy plan).

## 2023-05-15 NOTE — ANESTHESIA CARE TRANSFER NOTE
Patient: Jose Harvey    Procedure: Procedure(s):  RIGHT TOTAL HIP ARTHROPLASTY, POSTERIOR, AND HARDWARE REMOVAL       Diagnosis: Osteoarthritis of right hip [M16.11]  Diagnosis Additional Information: No value filed.    Anesthesia Type:   Spinal     Note:    Oropharynx: oropharynx clear of all foreign objects  Level of Consciousness: awake  Oxygen Supplementation: face mask  Level of Supplemental Oxygen (L/min / FiO2): 6  Independent Airway: airway patency satisfactory and stable  Dentition: dentition unchanged  Vital Signs Stable: post-procedure vital signs reviewed and stable  Report to RN Given: handoff report given  Patient transferred to: PACU    Handoff Report: Identifed the Patient, Identified the Reponsible Provider, Reviewed the pertinent medical history, Discussed the surgical course, Reviewed Intra-OP anesthesia mangement and issues during anesthesia, Set expectations for post-procedure period and Allowed opportunity for questions and acknowledgement of understanding      Vitals:  Vitals Value Taken Time   /72 05/15/23 0933   Temp 36.3  C (97.4  F) 05/15/23 0933   Pulse 91 05/15/23 0933   Resp 17 05/15/23 0933   SpO2 100 % 05/15/23 0933       Electronically Signed By: TIANNA Plaza CRNA  May 15, 2023  9:35 AM

## 2023-05-16 NOTE — PROGRESS NOTES
Surgeon: Dr Jordan [id number]  Hospital: [facility]  Name of Surgery: right total hip arthroplasty  Date of Surgery: 5/15/23    Hi,  I am a registerednurse calling from [employer] to check in and see how you are doing following your joint replacement surgery today.    1) Is your pain level manageable? 2  If not, have you been taking your pain medications asprescribed and have you tried icing and elevating your surgical extremity? icing  2) Are you having any new or increased numbness or tingling in your surgical leg? no  3) Are you having increased swelling around yoursurgical site? No.  4) Are you having any drainage from your incision? no If so, is the drainage saturating or coming through your dressing? no  5) Have you been able to walk short distances around your home with a walker? Yes, doing very well  6) Have you been able to urinate since surgery? yes  7) If patient is a uni-compartmental-knee or total knee replacement, do they have outpatient physical therapy set up? No therapy needed for a hip . If not, direct them tocontact their surgeon's office to ask if they recommend outpatient physical therapy. Please note: most total hip replacement patients do not need outpatient physical therapy unless their surgeon specifically orders it.    7) Do you have any other questions or concerns at this time? Reviewed medications, patient wanted to know if he needed to be on an antibiotic.  If patient has significant concerns after hours make sure they have appropriate after hours call number for their surgeon.

## 2023-05-16 NOTE — PROGRESS NOTES
Surgeon: Patient called at 2010  [facility]: [facility]  Name of Surgery: Right total hip arthroplasty direct anterior  Date of Surgery: 5/15/2023    Hi,  I am a registerednurse calling from [email or web address] to check in and see how you are doing following your joint replacement surgery today.    1) Is your pain level manageable? Pain level 2.  If not, have you been taking your pain medications asprescribed and have you tried icing and elevating your surgical extremity? Taking tylenol  2) Are you having any new or increased numbness or tingling in your surgical leg?  no  3) Are you having increased swelling around yoursurgical site? no .   4) Are you having any drainage from your incision? no   If so, is the drainage saturating or coming through your dressing? no  5) Have you been able to walk short distances around your home witha walker? Yes doing well  6) Have you been able to urinate since surgery? yes  7) If patient is a uni-compartmental-knee or total knee replacement, do they have outpatient physical therapy set up? . If not, direct them tocontact their surgeon's office to ask if they recommend outpatient physical therapy. Please note: most total hip replacement patients do not need outpatient physical therapy unless their surgeon specifically orders it.    7) Do you have any other questions or concerns at this time?no I am doing well.  If patient has significant concerns after hours make sure they have appropriate after hours call number for their surgeon.

## 2023-05-26 ENCOUNTER — TRANSFERRED RECORDS (OUTPATIENT)
Dept: HEALTH INFORMATION MANAGEMENT | Facility: CLINIC | Age: 63
End: 2023-05-26
Payer: COMMERCIAL

## 2023-06-03 ENCOUNTER — HEALTH MAINTENANCE LETTER (OUTPATIENT)
Age: 63
End: 2023-06-03

## 2023-06-13 ENCOUNTER — TRANSFERRED RECORDS (OUTPATIENT)
Dept: HEALTH INFORMATION MANAGEMENT | Facility: CLINIC | Age: 63
End: 2023-06-13
Payer: COMMERCIAL

## 2023-07-03 DIAGNOSIS — J45.20 MILD INTERMITTENT ASTHMA WITHOUT COMPLICATION: ICD-10-CM

## 2023-07-03 RX ORDER — FLUTICASONE PROPIONATE 220 UG/1
1 AEROSOL, METERED RESPIRATORY (INHALATION) 2 TIMES DAILY
Qty: 12 G | Refills: 11 | Status: SHIPPED | OUTPATIENT
Start: 2023-07-03

## 2023-07-03 NOTE — TELEPHONE ENCOUNTER
Pending Prescriptions:                       Disp   Refills    fluticasone (FLOVENT HFA) 220 MCG/ACT inh*12 g   11           Sig: Inhale 1 puff into the lungs 2 times daily

## 2024-09-15 ENCOUNTER — HEALTH MAINTENANCE LETTER (OUTPATIENT)
Age: 64
End: 2024-09-15

## 2025-01-08 ENCOUNTER — OFFICE VISIT (OUTPATIENT)
Age: 65
End: 2025-01-08

## 2025-01-08 VITALS
HEART RATE: 72 BPM | HEIGHT: 70 IN | SYSTOLIC BLOOD PRESSURE: 126 MMHG | OXYGEN SATURATION: 98 % | BODY MASS INDEX: 37.37 KG/M2 | TEMPERATURE: 97.9 F | RESPIRATION RATE: 17 BRPM | WEIGHT: 261 LBS | DIASTOLIC BLOOD PRESSURE: 84 MMHG

## 2025-01-08 DIAGNOSIS — R05.1 ACUTE COUGH: ICD-10-CM

## 2025-01-08 DIAGNOSIS — Z00.00 HEALTH CARE MAINTENANCE: ICD-10-CM

## 2025-01-08 DIAGNOSIS — Z13.220 LIPID SCREENING: ICD-10-CM

## 2025-01-08 DIAGNOSIS — Z12.5 ENCOUNTER FOR SCREENING FOR MALIGNANT NEOPLASM OF PROSTATE: ICD-10-CM

## 2025-01-08 DIAGNOSIS — J20.9 ACUTE BRONCHITIS, UNSPECIFIED ORGANISM: Primary | ICD-10-CM

## 2025-01-08 RX ORDER — CEFDINIR 300 MG/1
300 CAPSULE ORAL 2 TIMES DAILY
Qty: 14 CAPSULE | Refills: 0 | Status: SHIPPED | OUTPATIENT
Start: 2025-01-08 | End: 2025-01-15

## 2025-01-08 RX ORDER — DEXAMETHASONE SODIUM PHOSPHATE 10 MG/ML
10 INJECTION INTRAMUSCULAR; INTRAVENOUS ONCE
Status: COMPLETED | OUTPATIENT
Start: 2025-01-08 | End: 2025-01-08

## 2025-01-08 RX ORDER — METHYLPREDNISOLONE 4 MG/1
TABLET ORAL
Qty: 21 TABLET | Refills: 0 | Status: SHIPPED | OUTPATIENT
Start: 2025-01-08 | End: 2025-01-14

## 2025-01-08 RX ADMIN — DEXAMETHASONE SODIUM PHOSPHATE 10 MG: 10 INJECTION INTRAMUSCULAR; INTRAVENOUS at 10:19

## 2025-01-08 SDOH — ECONOMIC STABILITY: FOOD INSECURITY: WITHIN THE PAST 12 MONTHS, YOU WORRIED THAT YOUR FOOD WOULD RUN OUT BEFORE YOU GOT MONEY TO BUY MORE.: NEVER TRUE

## 2025-01-08 SDOH — ECONOMIC STABILITY: FOOD INSECURITY: WITHIN THE PAST 12 MONTHS, THE FOOD YOU BOUGHT JUST DIDN'T LAST AND YOU DIDN'T HAVE MONEY TO GET MORE.: NEVER TRUE

## 2025-01-08 ASSESSMENT — PATIENT HEALTH QUESTIONNAIRE - PHQ9
SUM OF ALL RESPONSES TO PHQ QUESTIONS 1-9: 0
2. FEELING DOWN, DEPRESSED OR HOPELESS: NOT AT ALL
SUM OF ALL RESPONSES TO PHQ QUESTIONS 1-9: 0
SUM OF ALL RESPONSES TO PHQ9 QUESTIONS 1 & 2: 0
SUM OF ALL RESPONSES TO PHQ QUESTIONS 1-9: 0
1. LITTLE INTEREST OR PLEASURE IN DOING THINGS: NOT AT ALL
SUM OF ALL RESPONSES TO PHQ QUESTIONS 1-9: 0

## 2025-01-08 ASSESSMENT — ENCOUNTER SYMPTOMS
FACIAL SWELLING: 0
WHEEZING: 0
RHINORRHEA: 0
COUGH: 1
SHORTNESS OF BREATH: 0
EYE DISCHARGE: 0
SORE THROAT: 0
EYE PAIN: 0
CHEST TIGHTNESS: 1
SINUS PRESSURE: 0
NAUSEA: 0
VOMITING: 0

## 2025-01-08 NOTE — PROGRESS NOTES
SUBJECTIVE:  Graham Bush is a 64 y.o. who presents today for Sinusitis      HPI    Graham presents today for an acute visit.   He has had acute symptoms for one week. He reports head and chest congestion. Drainage and sputum is productive, clear to  yellow.   Cough is persistent.   Appetite unchanged.  Has fatigue and is not resting well.  Taking dayquil and nyquil for symptoms.  No fever or chills.   Underlying asthma, has inhalers but has not felt need to add on yet.    Due for labs and wellness visit.    Past Medical History:   Diagnosis Date    Asthma      Past Surgical History:   Procedure Laterality Date    HIP SURGERY      pin in right hip    JOINT REPLACEMENT Left 05/2021    [facility] in [place]    KNEE ARTHROPLASTY Right     SHOULDER SURGERY Right      Family History   Problem Relation Age of Onset    Other Mother         scleraderma- feeding tube    Alcohol Abuse Father     Heart Disease Father     No Known Problems Sister     No Known Problems Sister     No Known Problems Half-Sibling      Social History     Tobacco Use    Smoking status: Never    Smokeless tobacco: Never   Substance Use Topics    Alcohol use: Not Currently     Current Outpatient Medications   Medication Sig Dispense Refill    methylPREDNISolone (MEDROL DOSEPACK) 4 MG tablet Take by mouth. 21 tablet 0    cefdinir (OMNICEF) 300 MG capsule Take 1 capsule by mouth 2 times daily for 7 days 14 capsule 0     No current facility-administered medications for this visit.     Allergies   Allergen Reactions    Flagyl [Metronidazole]        Review of Systems   Constitutional:  Positive for fatigue. Negative for appetite change, chills and fever.   HENT:  Positive for congestion. Negative for ear discharge, ear pain, facial swelling, hearing loss, postnasal drip, rhinorrhea, sinus pressure and sore throat.    Eyes:  Negative for pain, discharge and visual disturbance.   Respiratory:  Positive for cough and chest tightness. Negative

## 2025-01-08 NOTE — PROGRESS NOTES
After obtaining consent, and per orders of Dr. Holland, injection of Dex10 given in Left deltoid by Serena Hdz MA. Patient instructed to remain in clinic for 20 minutes afterwards, and to report any adverse reaction to me immediately.

## 2025-02-05 ENCOUNTER — OFFICE VISIT (OUTPATIENT)
Age: 65
End: 2025-02-05
Payer: MEDICARE

## 2025-02-05 VITALS
OXYGEN SATURATION: 96 % | BODY MASS INDEX: 36.51 KG/M2 | HEIGHT: 70 IN | HEART RATE: 93 BPM | WEIGHT: 255 LBS | RESPIRATION RATE: 17 BRPM | DIASTOLIC BLOOD PRESSURE: 110 MMHG | TEMPERATURE: 97.2 F | SYSTOLIC BLOOD PRESSURE: 162 MMHG

## 2025-02-05 DIAGNOSIS — Z00.00 INITIAL MEDICARE ANNUAL WELLNESS VISIT: Primary | ICD-10-CM

## 2025-02-05 DIAGNOSIS — J45.20 MILD INTERMITTENT ASTHMA WITHOUT COMPLICATION: ICD-10-CM

## 2025-02-05 DIAGNOSIS — R03.0 ELEVATED BLOOD PRESSURE READING: ICD-10-CM

## 2025-02-05 DIAGNOSIS — Z12.11 ENCOUNTER FOR SCREENING FOR MALIGNANT NEOPLASM OF COLON: ICD-10-CM

## 2025-02-05 PROCEDURE — 1159F MED LIST DOCD IN RCRD: CPT | Performed by: CLINICAL NURSE SPECIALIST

## 2025-02-05 PROCEDURE — G0438 PPPS, INITIAL VISIT: HCPCS | Performed by: CLINICAL NURSE SPECIALIST

## 2025-02-05 PROCEDURE — 1123F ACP DISCUSS/DSCN MKR DOCD: CPT | Performed by: CLINICAL NURSE SPECIALIST

## 2025-02-05 RX ORDER — ALBUTEROL SULFATE 90 UG/1
2 INHALANT RESPIRATORY (INHALATION) EVERY 6 HOURS PRN
COMMUNITY
End: 2025-02-07 | Stop reason: SDUPTHER

## 2025-02-05 ASSESSMENT — ENCOUNTER SYMPTOMS
TROUBLE SWALLOWING: 0
COLOR CHANGE: 0
BACK PAIN: 0
CHEST TIGHTNESS: 0
SHORTNESS OF BREATH: 0
VOMITING: 0
CONSTIPATION: 0
NAUSEA: 0
DIARRHEA: 0
SORE THROAT: 0
COUGH: 0
EYE DISCHARGE: 0
WHEEZING: 0
SINUS PRESSURE: 0
EYE PAIN: 0
ABDOMINAL PAIN: 0

## 2025-02-05 ASSESSMENT — PATIENT HEALTH QUESTIONNAIRE - PHQ9
SUM OF ALL RESPONSES TO PHQ QUESTIONS 1-9: 0
1. LITTLE INTEREST OR PLEASURE IN DOING THINGS: NOT AT ALL
SUM OF ALL RESPONSES TO PHQ QUESTIONS 1-9: 0
2. FEELING DOWN, DEPRESSED OR HOPELESS: NOT AT ALL
SUM OF ALL RESPONSES TO PHQ QUESTIONS 1-9: 0
SUM OF ALL RESPONSES TO PHQ QUESTIONS 1-9: 0
SUM OF ALL RESPONSES TO PHQ9 QUESTIONS 1 & 2: 0

## 2025-02-05 ASSESSMENT — LIFESTYLE VARIABLES
HOW MANY STANDARD DRINKS CONTAINING ALCOHOL DO YOU HAVE ON A TYPICAL DAY: 1 OR 2
HOW OFTEN DO YOU HAVE A DRINK CONTAINING ALCOHOL: MONTHLY OR LESS

## 2025-02-05 NOTE — PROGRESS NOTES
States he was on long term and short term inhalers but hasn't had to have filled since he's been here and doesn't remember the name but is now out.

## 2025-02-05 NOTE — PROGRESS NOTES
Medicare Annual Wellness Visit    Graham Bush is here for Medicare AWV    Assessment & Plan   Initial Medicare annual wellness visit  Encounter for screening for malignant neoplasm of colon  -     Sarbjit Pardo MD, Gastroenterology, [place]     No follow-ups on file.     Subjective     HM  PSA- due  Colon- due; at least 5 years ago in [place]   Immunizations- discussed     Patient's complete Health Risk Assessment and screening values have been reviewed and are found in Flowsheets. The following problems were reviewed today and where indicated follow up appointments were made and/or referrals ordered.    Positive Risk Factor Screenings with Interventions:             General HRA Questions:  Select all that apply: (!) Stress, Anger  Interventions - Stress:  Patient comments: manageable  Interventions - Anger:  Patient comments: manageable         Abnormal BMI (obese):  Body mass index is 36.59 kg/m². (!) Abnormal  Interventions:  Patient comments: will work on diet and exercise      Dentist Screen:  Have you seen the dentist within the past year?: (!) No    Intervention:  Advised to schedule with their dentist    Hearing Screen:  Do you or your family notice any trouble with your hearing that hasn't been managed with hearing aids?: (!) Yes    Interventions:  Patient comments: has tried otc hearing aides, will check ears     Vision Screen:  Do you have difficulty driving, watching TV, or doing any of your daily activities because of your eyesight?: No  Have you had an eye exam within the past year?: (!) No  Interventions:   Patient encouraged to make appointment with their eye specialist      Advanced Directives:  Do you have a Living Will?: (!) No    Intervention:  has NO advanced directive - not interested in additional information                 Objective   Vitals:    02/05/25 1244   BP: (!) 154/110   Site: Left Upper Arm   Position: Sitting   Cuff Size: Medium Adult   Pulse: 93   Resp: 17   Temp:

## 2025-02-05 NOTE — PATIENT INSTRUCTIONS
this test when you get your 's license or apply for some types of jobs.  Visual field tests  These tests are used:  To check for vision loss in any area of your range of vision.  To screen for certain eye diseases.  To look for nerve damage after a stroke, head injury, or other problem that could reduce blood flow to the brain.  Refraction and color tests  A refraction test is done to find the right prescription for glasses and contact lenses.  A color vision test is done to check for color blindness.  Color vision is often tested as part of a routine exam. You may also have this test when you apply for a job where recognizing different colors is important, such as , electronics, or the .  How are vision tests done?  Visual acuity test   You cover one eye at a time.  You read aloud from a wall chart across the room.  You read aloud from a small card that you hold in your hand.  Refraction   You look into a special device.  The device puts lenses of different strengths in front of each eye to see how strong your glasses or contact lenses need to be.  Visual field tests   Your doctor may have you look through special machines.  Or your doctor may simply have you stare straight ahead while they move a finger into and out of your field of vision.  Color vision test   You look at pieces of printed test patterns in various colors. You say what number or symbol you see.  Your doctor may have you trace the number or symbol using a pointer.  How do these tests feel?  There is very little chance of having a problem from this test. If dilating drops are used for a vision test, they may make the eyes sting and cause a medicine taste in the mouth.  Follow-up care is a key part of your treatment and safety. Be sure to make and go to all appointments, and call your doctor if you are having problems. It's also a good idea to know your test results and keep a list of the medicines you take.  Where can you

## 2025-02-05 NOTE — PROGRESS NOTES
SUBJECTIVE:  Graham Bush is a 64 y.o. who presents today for Medicare AWV      HPI    Mr Bush presents today for AWV.  Last physical was at least 2 years ago while living in [place].  Underlying health history includes asthma, that has been stable with ? Flovent and ventolin.  He is unsure on dose but will let me know when needs refills with accurate dosing of flovent.  Reviewed immunizations today. He defers today.     Blood pressure elevated today. States only other time elevated was at dentist 5 years ago.   No symptoms of high blood pressure.   He was in office 3 weeks ago for acute illness and bp was ok.   Asked him to obtain BP cuff and check bp at home and keep log    Has chronic hearing loss and tinnitus.     Past Medical History:   Diagnosis Date    Asthma     REGINA (obstructive sleep apnea)     diagnosed but not wearing CPAP regularly    Osteoarthritis      Past Surgical History:   Procedure Laterality Date    CHOLECYSTECTOMY      HIP SURGERY      pin in right hip    JOINT REPLACEMENT Left 05/2021    [facility] in [place]    KNEE ARTHROPLASTY Right     ACL, then joint replacement    SHOULDER SURGERY Right      Family History   Problem Relation Age of Onset    Other Mother         scleroderma    Alcohol Abuse Father     Heart Disease Father     Cancer Sister     No Known Problems Sister     No Known Problems Half-Sibling      Social History     Tobacco Use    Smoking status: Never    Smokeless tobacco: Never    Tobacco comments:     Occ cigar- twice per year   Substance Use Topics    Alcohol use: Not Currently     Comment: few times per year     Current Outpatient Medications   Medication Sig Dispense Refill    albuterol sulfate HFA (VENTOLIN HFA) 108 (90 Base) MCG/ACT inhaler Inhale 2 puffs into the lungs every 6 hours as needed for Wheezing       No current facility-administered medications for this visit.     Allergies   Allergen Reactions    Flagyl [Metronidazole]        Review of

## 2025-02-07 ENCOUNTER — TELEPHONE (OUTPATIENT)
Age: 65
End: 2025-02-07

## 2025-02-07 DIAGNOSIS — Z12.5 ENCOUNTER FOR SCREENING FOR MALIGNANT NEOPLASM OF PROSTATE: ICD-10-CM

## 2025-02-07 DIAGNOSIS — R79.89 ELEVATED LFTS: ICD-10-CM

## 2025-02-07 DIAGNOSIS — D64.9 ANEMIA, UNSPECIFIED TYPE: ICD-10-CM

## 2025-02-07 DIAGNOSIS — Z00.00 HEALTH CARE MAINTENANCE: ICD-10-CM

## 2025-02-07 DIAGNOSIS — J45.20 MILD INTERMITTENT ASTHMA WITHOUT COMPLICATION: Primary | ICD-10-CM

## 2025-02-07 DIAGNOSIS — Z13.220 LIPID SCREENING: ICD-10-CM

## 2025-02-07 LAB
ALBUMIN SERPL-MCNC: 4.2 G/DL (ref 3.5–5.2)
ALP SERPL-CCNC: 220 U/L (ref 40–129)
ALT SERPL-CCNC: 75 U/L (ref 5–41)
ANION GAP SERPL CALCULATED.3IONS-SCNC: 16 MMOL/L (ref 8–16)
AST SERPL-CCNC: 72 U/L (ref 5–40)
BASOPHILS # BLD: 0 K/UL (ref 0–0.2)
BASOPHILS NFR BLD: 0.5 % (ref 0–1)
BILIRUB SERPL-MCNC: 0.6 MG/DL (ref 0.2–1.2)
BUN SERPL-MCNC: 11 MG/DL (ref 8–23)
CALCIUM SERPL-MCNC: 9.8 MG/DL (ref 8.8–10.2)
CHLORIDE SERPL-SCNC: 100 MMOL/L (ref 98–107)
CHOLEST SERPL-MCNC: 237 MG/DL (ref 0–199)
CO2 SERPL-SCNC: 24 MMOL/L (ref 22–29)
CREAT SERPL-MCNC: 1 MG/DL (ref 0.7–1.2)
EOSINOPHIL # BLD: 0.4 K/UL (ref 0–0.6)
EOSINOPHIL NFR BLD: 7.2 % (ref 0–5)
ERYTHROCYTE [DISTWIDTH] IN BLOOD BY AUTOMATED COUNT: 12.9 % (ref 11.5–14.5)
GLUCOSE SERPL-MCNC: 72 MG/DL (ref 70–99)
HCT VFR BLD AUTO: 41.7 % (ref 42–52)
HDLC SERPL-MCNC: 70 MG/DL (ref 40–60)
HGB BLD-MCNC: 13.6 G/DL (ref 14–18)
IMM GRANULOCYTES # BLD: 0 K/UL
LDLC SERPL CALC-MCNC: 145 MG/DL
LYMPHOCYTES # BLD: 1.4 K/UL (ref 1.1–4.5)
LYMPHOCYTES NFR BLD: 23.6 % (ref 20–40)
MCH RBC QN AUTO: 28.9 PG (ref 27–31)
MCHC RBC AUTO-ENTMCNC: 32.6 G/DL (ref 33–37)
MCV RBC AUTO: 88.5 FL (ref 80–94)
MONOCYTES # BLD: 0.6 K/UL (ref 0–0.9)
MONOCYTES NFR BLD: 10.6 % (ref 0–10)
NEUTROPHILS # BLD: 3.4 K/UL (ref 1.5–7.5)
NEUTS SEG NFR BLD: 57.8 % (ref 50–65)
PLATELET # BLD AUTO: 291 K/UL (ref 130–400)
PMV BLD AUTO: 10.4 FL (ref 9.4–12.4)
POTASSIUM SERPL-SCNC: 4.5 MMOL/L (ref 3.5–5.1)
PROT SERPL-MCNC: 7.4 G/DL (ref 6.4–8.3)
PSA SERPL-MCNC: 0.89 NG/ML (ref 0–4)
RBC # BLD AUTO: 4.71 M/UL (ref 4.7–6.1)
SODIUM SERPL-SCNC: 140 MMOL/L (ref 136–145)
TRIGL SERPL-MCNC: 110 MG/DL (ref 0–149)
WBC # BLD AUTO: 5.8 K/UL (ref 4.8–10.8)

## 2025-02-07 RX ORDER — FLUTICASONE PROPIONATE 220 UG/1
2 AEROSOL, METERED RESPIRATORY (INHALATION) 2 TIMES DAILY
Qty: 12 G | Refills: 5 | Status: SHIPPED | OUTPATIENT
Start: 2025-02-07 | End: 2026-02-07

## 2025-02-07 RX ORDER — ALBUTEROL SULFATE 90 UG/1
2 INHALANT RESPIRATORY (INHALATION) EVERY 6 HOURS PRN
Qty: 18 G | Refills: 5 | Status: SHIPPED | OUTPATIENT
Start: 2025-02-07

## 2025-02-07 NOTE — TELEPHONE ENCOUNTER
Flovent and ventolin refills sent in for him.     Let him know that labs show high cholesterol and an elevation in his liver enzymes and a mild anemia.     The cholesterol is about 40 points high and was not present on LIPID in 2022  Needs to work on diet, exercise and weight loss.  Elevated liver enzymes could be related as well and could be due to recent illness.  Anemia is very mild.    I would like to repeat labs in 2 months for liver enzymes and anemia.  Will repeat cholesterol in 6-12 months after he has had time to work on diet, weight loss.    Placing orders now for labs in 2 months. Can walk in around that time.

## 2025-04-08 ENCOUNTER — ANESTHESIA EVENT (OUTPATIENT)
Dept: OPERATING ROOM | Age: 65
End: 2025-04-08

## 2025-04-08 ENCOUNTER — ANESTHESIA (OUTPATIENT)
Dept: OPERATING ROOM | Age: 65
End: 2025-04-08

## 2025-04-08 ENCOUNTER — APPOINTMENT (OUTPATIENT)
Dept: OPERATING ROOM | Age: 65
End: 2025-04-08
Attending: INTERNAL MEDICINE

## 2025-04-08 ENCOUNTER — HOSPITAL ENCOUNTER (OUTPATIENT)
Age: 65
Setting detail: OUTPATIENT SURGERY
Discharge: HOME | End: 2025-04-08
Attending: INTERNAL MEDICINE | Admitting: INTERNAL MEDICINE
Payer: MEDICARE

## 2025-04-08 ENCOUNTER — HOSPITAL ENCOUNTER (OUTPATIENT)
Age: 65
Setting detail: SPECIMEN
Discharge: HOME | End: 2025-04-08
Payer: MEDICARE

## 2025-04-08 VITALS
WEIGHT: 230 LBS | BODY MASS INDEX: 32.93 KG/M2 | DIASTOLIC BLOOD PRESSURE: 74 MMHG | TEMPERATURE: 97.8 F | RESPIRATION RATE: 16 BRPM | HEIGHT: 70 IN | OXYGEN SATURATION: 98 % | SYSTOLIC BLOOD PRESSURE: 131 MMHG | HEART RATE: 75 BPM

## 2025-04-08 PROCEDURE — 45385 COLONOSCOPY W/LESION REMOVAL: CPT | Performed by: INTERNAL MEDICINE

## 2025-04-08 PROCEDURE — 88305 TISSUE EXAM BY PATHOLOGIST: CPT

## 2025-04-08 PROCEDURE — 45385 COLONOSCOPY W/LESION REMOVAL: CPT

## 2025-04-08 RX ORDER — PROPOFOL 10 MG/ML
INJECTION, EMULSION INTRAVENOUS
Status: DISCONTINUED | OUTPATIENT
Start: 2025-04-08 | End: 2025-04-08 | Stop reason: SDUPTHER

## 2025-04-08 RX ORDER — SODIUM CHLORIDE, SODIUM LACTATE, POTASSIUM CHLORIDE, CALCIUM CHLORIDE 600; 310; 30; 20 MG/100ML; MG/100ML; MG/100ML; MG/100ML
INJECTION, SOLUTION INTRAVENOUS CONTINUOUS
Status: DISCONTINUED | OUTPATIENT
Start: 2025-04-08 | End: 2025-04-08 | Stop reason: HOSPADM

## 2025-04-08 RX ORDER — LIDOCAINE HYDROCHLORIDE 10 MG/ML
INJECTION, SOLUTION INFILTRATION; PERINEURAL
Status: DISCONTINUED | OUTPATIENT
Start: 2025-04-08 | End: 2025-04-08 | Stop reason: SDUPTHER

## 2025-04-08 RX ADMIN — SODIUM CHLORIDE, SODIUM LACTATE, POTASSIUM CHLORIDE, CALCIUM CHLORIDE: 600; 310; 30; 20 INJECTION, SOLUTION INTRAVENOUS at 07:31

## 2025-04-08 RX ADMIN — PROPOFOL 360 MG: 10 INJECTION, EMULSION INTRAVENOUS at 09:17

## 2025-04-08 RX ADMIN — LIDOCAINE HYDROCHLORIDE 50 MG: 10 INJECTION, SOLUTION INFILTRATION; PERINEURAL at 09:17

## 2025-04-08 ASSESSMENT — PAIN - FUNCTIONAL ASSESSMENT
PAIN_FUNCTIONAL_ASSESSMENT: NONE - DENIES PAIN

## 2025-04-08 NOTE — ANESTHESIA PRE PROCEDURE
Department of Anesthesiology  Preprocedure Note       Name:  Graham Bush   Age:  65 y.o.  :  1960                                          MRN:  [id number]         Date:  2025      Surgeon: Surgeon(s):  Sheree Silva MD    Procedure: Procedure(s):  COLORECTAL CANCER SCREENING, NOT HIGH RISK    Medications prior to admission:   Prior to Admission medications    Medication Sig Start Date End Date Taking? Authorizing Provider   albuterol sulfate HFA (VENTOLIN HFA) 108 (90 Base) MCG/ACT inhaler Inhale 2 puffs into the lungs every 6 hours as needed for Wheezing or Shortness of Breath 25  Yes Arielle Holland APRN   fluticasone (FLOVENT HFA) 220 MCG/ACT inhaler Inhale 2 puffs into the lungs 2 times daily 25 Yes Arielle Holland APRN       Current medications:    Current Facility-Administered Medications   Medication Dose Route Frequency Provider Last Rate Last Admin    lactated ringers infusion   IntraVENous Continuous Sheree Silva  mL/hr at 25 0731 New Bag at 25 0731       Allergies:    Allergies   Allergen Reactions    Flagyl [Metronidazole]        Problem List:    Patient Active Problem List   Diagnosis Code    Mild intermittent asthma without complication J45.20       Past Medical History:        Diagnosis Date    Asthma     REGINA (obstructive sleep apnea)     diagnosed but not wearing CPAP regularly    Osteoarthritis        Past Surgical History:        Procedure Laterality Date    CHOLECYSTECTOMY      HIP SURGERY      pin in right hip    JOINT REPLACEMENT Left 2021    [facility]    KNEE ARTHROPLASTY Right     ACL, then joint replacement    SHOULDER SURGERY Right        Social History:    Social History     Tobacco Use    Smoking status: Never    Smokeless tobacco: Never    Tobacco comments:     Occ cigar- twice per year   Substance Use Topics    Alcohol use: Not Currently     Comment: few times per year

## 2025-04-08 NOTE — DISCHARGE INSTRUCTIONS
1. Repeat colonoscopy: pending pathology -based on coloscopy findings today and prep quality, in 3 years for surveillance; sooner if his personal or family history as pertaining to colorectal cancer risk changes requiring an earlier exam or if the patient were to develop lower GI symptoms such as bleeding, abdominal pain, change in bowel habits or stool caliber or if the patient has anemia or unexplained weight loss in the future.   2. Await biopsy results-you will receive a letter with your results within 7-10 days    - Resume previous meds and diet  - GI clinic f/u PRN   - Keep scheduled f/u appts with other MDs     - NO ASA for 5 days and no NSAIDs x 2 weeks     POST-OP ORDERS: ENDOSCOPY & COLONOSCOPY:    1. Rest today.    2. DO NOT eat or drink until wide awake; eat your usual diet today in moderate amount only.    3. DO NOT drive today.    4. Call physician if you have severe pain, vomiting, fever, rectal bleeding or black bowel movements.    5.  If a biopsy was taken or a polyp removed, you should expect to hear results in about 7-10 days.  If you have heard nothing from your physician by then, call the office for results.    6.  Discharge home when patient awake, vitals signs stable and tolerating liquids.    7. Call with questions or concerns [phone].    NSAIDS (Non-steroidal Anti-Inflammatory)    You have been directed by your physician to avoid any NSAID's; the following medications are a list of those to avoid. If you think that you are taking any NSAID's notify your physician.     Over The Counter    Advil                      Motrin  Nuprin                   Ibuprofen  Midol                     Aleve  Naproxen              Orudis  Aspirin                   Deny    Prescriptions and Generics    Cataflam              Relafen  Voltaren               Clinoril  Indocin                 Naproxen  Arthrotec              Lodine  Daypro                 Nalfon  Toradol                Ansaid  Feldene                Meclofenamate  Fenoprofen          Ponstel  Mobic                   Celebrex  Vioxx

## 2025-04-08 NOTE — ANESTHESIA POSTPROCEDURE EVALUATION
Department of Anesthesiology  Postprocedure Note    Patient: Graham Bush  MRN: [id number]  YOB: 1960  Date of evaluation: 4/8/2025    Procedure Summary       Date: 04/08/25 Room / Location: [place] [id number] / [facility]    Anesthesia Start: 0911 Anesthesia Stop: 0935    Procedures:       COLORECTAL CANCER SCREENING, NOT HIGH RISK (Abdomen)      COLONOSCOPY POLYPECTOMY SNARE/BIOPSY (Abdomen) Diagnosis:       Screen for colon cancer      (Screen for colon cancer [Z12.11])    Surgeons: Sheree Silva MD Responsible Provider: Radha Valerio APRN - CRNA    Anesthesia Type: General, TIVA ASA Status: 2            Anesthesia Type: General, TIVA    Luna Phase I:      Luna Phase II:      Anesthesia Post Evaluation    Patient location during evaluation: bedside  Patient participation: waiting for patient participation  Level of consciousness: sleepy but conscious  Airway patency: patent  Nausea & Vomiting: no nausea and no vomiting  Cardiovascular status: blood pressure returned to baseline  Respiratory status: acceptable  Hydration status: euvolemic  Comments: /77   Pulse 65   Temp 97.5 °F (36.4 °C)   Resp 18   Ht 1.778 m (5' 10\")   Wt 104.3 kg (230 lb)   SpO2 99%   BMI 33.00 kg/m²       No notable events documented.

## 2025-04-08 NOTE — OP NOTE
Patient: Graham Bush : 1960  Med Rec#: [id number] Acc#: [id number]   Primary Care Provider Arielle Holland APRN    Date of Procedure:  2025    Endoscopist: Sheree Silva MD, MD    Referring Provider: Arielle Holland APRN,     Operation Performed: Colonoscopy up to the Cecum with     Hot snare resection of multiple polyps    Indications: Personal history of colon polyps; needs colonoscopy for surveillance    Anesthesia:  Sedation was administered by anesthesia who monitored the patient during the procedure.    I met with Graham Bush prior to procedure. We discussed the procedure itself, and I have discussed the risks of endoscopy (including-- but not limited to-- pain, discomfort, bleeding potentially requiring second endoscopic procedure and/or blood transfusion, organ perforation requiring operative repair, damage to organs near the colon, infection, aspiration, cardiopulmonary/allergic reaction), benefits, indications to endoscopy. Additionally, we discussed options other than colonoscopy. The patient expressed understanding. All questions answered. The patient decided to proceed with the procedure.  Signed informed consent was placed on the chart.    Blood Loss: minimal    Withdrawal time: More than 10 minutes  Bowel Prep:Fair  with small amounts of thick solid and semisolid stool and a moderate amount of thick, opaque liquid scattered in patchy segments throughout the colon obscuring the underlying mucosa.Lesions including polyps may have been missed.     Complications: no immediate complications    DESCRIPTION OF PROCEDURE:     A time out was performed. After written informed consent was obtained, the patient was placed in the left lateral position.     The perianal area was inspected, and a digital rectal exam was performed. A rectal exam was performed: normal tone, no palpable lesions. At this point, a forward viewing Olympus colonoscope was inserted into the anus  note were completed with a voice recognition program. Efforts were made to edit the dictations but occasionally words may be mis-transcribed.)     Sheree Silva MD, MD  4/8/2025  9:12 AM

## 2025-04-08 NOTE — H&P
Patient Name: Graham Bush  : 1960  MRN: [id number]  DATE: 25    Allergies:   Allergies   Allergen Reactions    Flagyl [Metronidazole]         ENDOSCOPY  History and Physical    Procedure:    [] Diagnostic Colonoscopy       [x] Screening Colonoscopy  [] EGD      [] ERCP      [] EUS       [] Other    [x] Previous office notes/History and Physical reviewed from the patients chart. Please see EMR for further details of HPI. I have examined the patient's status immediately prior to the procedure and:      Indications/HPI:    []Abdominal Pain   []Cancer- GI/Lung     []Fhx of colon CA/polyps  []History of Polyps  []Barretts            []Melena  []Abnormal Imaging              []Dysphagia              []Persistent Pneumonia   []Anemia                            []Food Impaction        [x]History of Polyps  [] GI Bleed             []Pulmonary nodule/Mass   []Change in bowel habits []Heartburn/Reflux  []Rectal Bleed (BRBPR)  []Chest Pain - Non Cardiac []Heme (+) Stool []Ulcers  []Constipation  []Hemoptysis  []Varices  []Diarrhea  []Hypoxemia    []Nausea/Vomiting   [x]Screening   []Crohns/Colitis  []Other:     Anesthesia:   [x] MAC [] Moderate Sedation   [] General   [] None     ROS: 12 pt Review of Symptoms was negative unless mentioned above    Medications:   Prior to Admission medications    Medication Sig Start Date End Date Taking? Authorizing Provider   albuterol sulfate HFA (VENTOLIN HFA) 108 (90 Base) MCG/ACT inhaler Inhale 2 puffs into the lungs every 6 hours as needed for Wheezing or Shortness of Breath 25  Yes Arielle Holland APRN   fluticasone (FLOVENT HFA) 220 MCG/ACT inhaler Inhale 2 puffs into the lungs 2 times daily 25 Yes Arielle Holland APRN       Past Medical History:  Past Medical History:   Diagnosis Date    Asthma     REGINA (obstructive sleep apnea)     diagnosed but not wearing CPAP regularly    Osteoarthritis        Past Surgical History:  Past Surgical

## 2025-04-09 ENCOUNTER — RESULTS FOLLOW-UP (OUTPATIENT)
Dept: GASTROENTEROLOGY | Age: 65
End: 2025-04-09

## (undated) DEVICE — DRAPE IOBAN INCISE 36X23" 6651EZ

## (undated) DEVICE — SUTURE VICRYL+ 2-0 27IN CT-1 UND VCP259H

## (undated) DEVICE — SUPPLEMENT DIGESTIVE H2O SOL GI-EASE

## (undated) DEVICE — SUCTION IRR SYSTEM W/O TIP INTERPULSE HANDPIECE 0210-100-000

## (undated) DEVICE — GLOVE BIOGEL PI SZ 8.0 40880

## (undated) DEVICE — BLADE SAW SAGITTAL STRK WIDE 25.4X85X1.2MM 2108-151-000

## (undated) DEVICE — ELECTRODE ELECSURG 2 PLATE AD 10 FT 33 LB PT RET MEGADYNE

## (undated) DEVICE — ALCOHOL ISOPROPYL 4 OZ 70% IA7004

## (undated) DEVICE — RETRIVER SUTURE LASSO HOFFEE BLUE 710000

## (undated) DEVICE — SU STRATAFIX PDS PLUS 1 CT-1 18" SXPP1A404

## (undated) DEVICE — DRAPE STERI TOWEL LG 1010

## (undated) DEVICE — GLOVE BIOGEL PI INDICATOR 8.0 LF 41680

## (undated) DEVICE — SOL NACL 0.9% IRRIG 1000ML BOTTLE 2F7124

## (undated) DEVICE — SUTURE VICRYL+ 0 27IN CT-1 UND VCP260H

## (undated) DEVICE — CLEANING SPONGE: Brand: KOALA™

## (undated) DEVICE — BONE CLEANING TIP INTERPULSE  0210-010-000

## (undated) DEVICE — BRUSH ENDOSCP 2 END CHN HEDGEHOG

## (undated) DEVICE — NEEDLE HYPO 21GA X 1-1/2 SAFETY 305917

## (undated) DEVICE — SU ETHIBOND 1 CT-1 30" X425H

## (undated) DEVICE — PREP POVIDONE-IODINE 7.5% SCRUB 4OZ BOTTLE MDS093945

## (undated) DEVICE — GOWN IMPERVIOUS BREATHABLE SMART XLG 89045

## (undated) DEVICE — SNARE POLYP SM W13MMXL240CM SHTH DIA2.4MM OVL FLX DISP

## (undated) DEVICE — CANNULA NSL AD L7FT DIV O2 CO2 W/ M LUERLOCK TRMPT CONN

## (undated) DEVICE — TRAY PREP DRY SKIN SCRUB 067

## (undated) DEVICE — SINGLE PORT MANIFOLD: Brand: NEPTUNE 2

## (undated) DEVICE — MAT FLOOR SURGICAL 40X38 0702140238

## (undated) DEVICE — PLATE GROUNDING ADULT W/CORD 9165L

## (undated) DEVICE — GLOVE SURG PI ULTRA TOUCH M SZ 7-1/2 LF

## (undated) DEVICE — SU ETHIBOND 5 V-37 4X30" MB66G

## (undated) DEVICE — TRAP,MUCUS SPECIMEN,40CC: Brand: MEDLINE

## (undated) DEVICE — HOLDER LIMB VELCRO OR 0814-1533

## (undated) DEVICE — ENDO KIT,LOURDES HOSPITAL: Brand: MEDLINE INDUSTRIES, INC.

## (undated) DEVICE — SUTURE VICRYL+ 1 27IN CT-1 UND VCP261H

## (undated) DEVICE — GLOVE BIOGEL PI SZ 8.5 40885

## (undated) DEVICE — SOLUTION IRRIG 2B7127 .9NS 3000ML BAG

## (undated) DEVICE — DRESSING MEPILEX BORDER POST-OP 4X12

## (undated) DEVICE — SUCTION MANIFOLD NEPTUNE 2 SYS 4 PORT 0702-020-000

## (undated) DEVICE — ADAPTER CLEANING PORPOISE CLEANING

## (undated) DEVICE — GLOVE BIOGEL INDICATOR 7.5 LF 41675

## (undated) DEVICE — CUSTOM PACK TOTAL HIP SOP5BTHHEA

## (undated) DEVICE — PILLOW HIP ABDUCTION CONCAVE 1.9

## (undated) DEVICE — SOL WATER IRRIG 1000ML BOTTLE 2F7114

## (undated) RX ORDER — PROPOFOL 10 MG/ML
INJECTION, EMULSION INTRAVENOUS
Status: DISPENSED
Start: 2023-05-15

## (undated) RX ORDER — ONDANSETRON 2 MG/ML
INJECTION INTRAMUSCULAR; INTRAVENOUS
Status: DISPENSED
Start: 2023-05-15

## (undated) RX ORDER — GLYCOPYRROLATE 0.2 MG/ML
INJECTION, SOLUTION INTRAMUSCULAR; INTRAVENOUS
Status: DISPENSED
Start: 2023-05-15

## (undated) RX ORDER — LIDOCAINE HYDROCHLORIDE 10 MG/ML
INJECTION, SOLUTION EPIDURAL; INFILTRATION; INTRACAUDAL; PERINEURAL
Status: DISPENSED
Start: 2023-05-15

## (undated) RX ORDER — DEXAMETHASONE SODIUM PHOSPHATE 10 MG/ML
INJECTION, EMULSION INTRAMUSCULAR; INTRAVENOUS
Status: DISPENSED
Start: 2023-05-15

## (undated) RX ORDER — PHENYLEPHRINE HYDROCHLORIDE 10 MG/ML
INJECTION INTRAVENOUS
Status: DISPENSED
Start: 2023-05-15

## (undated) RX ORDER — KETOROLAC TROMETHAMINE 30 MG/ML
INJECTION, SOLUTION INTRAMUSCULAR; INTRAVENOUS
Status: DISPENSED
Start: 2023-05-15

## (undated) RX ORDER — FENTANYL CITRATE-0.9 % NACL/PF 10 MCG/ML
PLASTIC BAG, INJECTION (ML) INTRAVENOUS
Status: DISPENSED
Start: 2023-05-15

## (undated) RX ORDER — EPHEDRINE SULFATE 50 MG/ML
INJECTION, SOLUTION INTRAMUSCULAR; INTRAVENOUS; SUBCUTANEOUS
Status: DISPENSED
Start: 2023-05-15